# Patient Record
Sex: MALE | Race: WHITE | NOT HISPANIC OR LATINO | URBAN - METROPOLITAN AREA
[De-identification: names, ages, dates, MRNs, and addresses within clinical notes are randomized per-mention and may not be internally consistent; named-entity substitution may affect disease eponyms.]

---

## 2017-03-11 ENCOUNTER — EMERGENCY (EMERGENCY)
Facility: HOSPITAL | Age: 52
LOS: 1 days | Discharge: HOME | End: 2017-03-11
Attending: EMERGENCY MEDICINE | Admitting: EMERGENCY MEDICINE
Payer: COMMERCIAL

## 2017-03-11 VITALS
RESPIRATION RATE: 18 BRPM | TEMPERATURE: 98 F | WEIGHT: 214.95 LBS | HEIGHT: 72 IN | HEART RATE: 76 BPM | SYSTOLIC BLOOD PRESSURE: 161 MMHG | OXYGEN SATURATION: 100 % | DIASTOLIC BLOOD PRESSURE: 97 MMHG

## 2017-03-11 DIAGNOSIS — M79.641 PAIN IN RIGHT HAND: ICD-10-CM

## 2017-03-11 DIAGNOSIS — I10 ESSENTIAL (PRIMARY) HYPERTENSION: ICD-10-CM

## 2017-03-11 DIAGNOSIS — G56.01 CARPAL TUNNEL SYNDROME, RIGHT UPPER LIMB: ICD-10-CM

## 2017-03-11 PROCEDURE — 99283 EMERGENCY DEPT VISIT LOW MDM: CPT | Mod: 25

## 2017-03-11 PROCEDURE — 99283 EMERGENCY DEPT VISIT LOW MDM: CPT

## 2017-03-11 RX ORDER — OXYCODONE HYDROCHLORIDE 5 MG/1
1 TABLET ORAL
Qty: 8 | Refills: 0 | OUTPATIENT
Start: 2017-03-11 | End: 2017-03-13

## 2017-03-11 RX ORDER — OXYCODONE HYDROCHLORIDE 5 MG/1
5 TABLET ORAL ONCE
Qty: 0 | Refills: 0 | Status: DISCONTINUED | OUTPATIENT
Start: 2017-03-11 | End: 2017-03-11

## 2017-03-11 RX ORDER — IBUPROFEN 200 MG
600 TABLET ORAL ONCE
Qty: 0 | Refills: 0 | Status: COMPLETED | OUTPATIENT
Start: 2017-03-11 | End: 2017-03-11

## 2017-03-11 RX ADMIN — OXYCODONE HYDROCHLORIDE 5 MILLIGRAM(S): 5 TABLET ORAL at 04:28

## 2017-03-11 RX ADMIN — Medication 600 MILLIGRAM(S): at 03:48

## 2017-03-11 NOTE — ED PROVIDER NOTE - MEDICAL DECISION MAKING DETAILS
50 yo with hx of carpel tunnel coming in with right hand pain s.p pulling injury, no numbness no tingling, severe pain over the carpel tunnel region. PLAN: pain control and have follow up with hand.

## 2017-03-11 NOTE — ED PROVIDER NOTE - ATTENDING CONTRIBUTION TO CARE
52 yo with hx of carpel tunnel coming in with right hand pain s.p pulling injury, no numbness no tingling, severe pain over the carpel tunnel region. PLAN: pain control and have follow up with hand.

## 2017-03-11 NOTE — ED PROVIDER NOTE - PLAN OF CARE
You may take 600mg of ibuprofen (example: motrin or advil) every 4-6 hours for baseline pain control as indicated with respect to the warnings on the label. This is an over the counter medication.   Take oxycodone as needed, as directed, for breakthrough pain. DO NOT DRINK OR OPERATE HEAVY MACHINERY UNDER THE INFLUENCE OF OXYCODONE. Additionally, take a stool softener while taking this medication.  follow up your Hand doctor tomorrow for definitive care

## 2017-03-11 NOTE — ED PROVIDER NOTE - OBJECTIVE STATEMENT
51 year old with severe hand pain in the right palm and 2, 3, 4th fingers. paresthesia sensation. history severe carpal tunnel with plan for surgery. pain began tonight. cant fall asleep. works construction. incident occurred when a heavy object slid out of his hand.     hand surgeon: Maureen

## 2017-03-11 NOTE — ED ADULT NURSE NOTE - OBJECTIVE STATEMENT
51 y.o male pmh HTN and carpal tunnel c/o R. hand pain and swelling. pt states he has a hx of carpal tunnel and tonight while trying to sleep he had sudden onset of R. hand pain, describes the pain as aching and throbbing, pain rated 9/10. did not take anything at home for pain prior to ED arrival. states he feels numbness in his fingers, no tingling. pulses present with equal warmth bilaterally. pt states that he usually gets cortisone injections in his hand which helps to relieve the pain. no decrease ROM present.

## 2017-03-11 NOTE — ED PROVIDER NOTE - NS ED ROS FT
ROS: No fever/chills, no eye pain, no throat pain, no chest pain, no shortness of breath, no abdominal pain,  no dysuria, right hand pain, no rashes, no focal neurologic complaints, no known mental health issues

## 2017-03-11 NOTE — ED PROVIDER NOTE - CARE PLAN
Principal Discharge DX:	Carpal tunnel syndrome  Instructions for follow-up, activity and diet:	You may take 600mg of ibuprofen (example: motrin or advil) every 4-6 hours for baseline pain control as indicated with respect to the warnings on the label. This is an over the counter medication.   Take oxycodone as needed, as directed, for breakthrough pain. DO NOT DRINK OR OPERATE HEAVY MACHINERY UNDER THE INFLUENCE OF OXYCODONE. Additionally, take a stool softener while taking this medication.  follow up your Hand doctor tomorrow for definitive care

## 2017-03-11 NOTE — ED PROVIDER NOTE - PROGRESS NOTE DETAILS
Earnestine PGY2: consulted Orthopedics, they do not do hand injections for carpal tunnel. patient offered to call hand specialist but patient declined and requested discharge with plan to follow up his doctor today

## 2017-03-11 NOTE — ED PROVIDER NOTE - PHYSICAL EXAMINATION
Earnestine: A & O x 3, NAD, HEENT WNL and no facial asymmetry; lungs CTAB, heart with reg rhythm without murmur; abdomen soft NTND; extremities with no edema; right hand with paresthesia in medial 2nd, 3rd, 4th digit and tender to palpation, no erythema and rash, diminished ability to flex fingers in right hand, neuro exam non focal with no motor or sensory deficits

## 2017-05-30 ENCOUNTER — OUTPATIENT (OUTPATIENT)
Dept: OUTPATIENT SERVICES | Facility: HOSPITAL | Age: 52
LOS: 1 days | End: 2017-05-30
Payer: COMMERCIAL

## 2017-05-30 VITALS
TEMPERATURE: 98 F | WEIGHT: 207.9 LBS | RESPIRATION RATE: 16 BRPM | SYSTOLIC BLOOD PRESSURE: 130 MMHG | DIASTOLIC BLOOD PRESSURE: 86 MMHG | HEART RATE: 69 BPM | HEIGHT: 70.5 IN

## 2017-05-30 DIAGNOSIS — I10 ESSENTIAL (PRIMARY) HYPERTENSION: ICD-10-CM

## 2017-05-30 DIAGNOSIS — G56.01 CARPAL TUNNEL SYNDROME, RIGHT UPPER LIMB: ICD-10-CM

## 2017-05-30 DIAGNOSIS — Z90.49 ACQUIRED ABSENCE OF OTHER SPECIFIED PARTS OF DIGESTIVE TRACT: Chronic | ICD-10-CM

## 2017-05-30 DIAGNOSIS — G47.33 OBSTRUCTIVE SLEEP APNEA (ADULT) (PEDIATRIC): ICD-10-CM

## 2017-05-30 DIAGNOSIS — G56.00 CARPAL TUNNEL SYNDROME, UNSPECIFIED UPPER LIMB: ICD-10-CM

## 2017-05-30 LAB
BUN SERPL-MCNC: 21 MG/DL — SIGNIFICANT CHANGE UP (ref 7–23)
CALCIUM SERPL-MCNC: 9.3 MG/DL — SIGNIFICANT CHANGE UP (ref 8.4–10.5)
CHLORIDE SERPL-SCNC: 98 MMOL/L — SIGNIFICANT CHANGE UP (ref 98–107)
CO2 SERPL-SCNC: 25 MMOL/L — SIGNIFICANT CHANGE UP (ref 22–31)
CREAT SERPL-MCNC: 1.23 MG/DL — SIGNIFICANT CHANGE UP (ref 0.5–1.3)
GLUCOSE SERPL-MCNC: 94 MG/DL — SIGNIFICANT CHANGE UP (ref 70–99)
HCT VFR BLD CALC: 48 % — SIGNIFICANT CHANGE UP (ref 39–50)
HGB BLD-MCNC: 16.6 G/DL — SIGNIFICANT CHANGE UP (ref 13–17)
MCHC RBC-ENTMCNC: 31.9 PG — SIGNIFICANT CHANGE UP (ref 27–34)
MCHC RBC-ENTMCNC: 34.6 % — SIGNIFICANT CHANGE UP (ref 32–36)
MCV RBC AUTO: 92.1 FL — SIGNIFICANT CHANGE UP (ref 80–100)
PLATELET # BLD AUTO: 238 K/UL — SIGNIFICANT CHANGE UP (ref 150–400)
PMV BLD: 11.2 FL — SIGNIFICANT CHANGE UP (ref 7–13)
POTASSIUM SERPL-MCNC: 3.8 MMOL/L — SIGNIFICANT CHANGE UP (ref 3.5–5.3)
POTASSIUM SERPL-SCNC: 3.8 MMOL/L — SIGNIFICANT CHANGE UP (ref 3.5–5.3)
RBC # BLD: 5.21 M/UL — SIGNIFICANT CHANGE UP (ref 4.2–5.8)
RBC # FLD: 14.2 % — SIGNIFICANT CHANGE UP (ref 10.3–14.5)
SODIUM SERPL-SCNC: 139 MMOL/L — SIGNIFICANT CHANGE UP (ref 135–145)
WBC # BLD: 9.34 K/UL — SIGNIFICANT CHANGE UP (ref 3.8–10.5)
WBC # FLD AUTO: 9.34 K/UL — SIGNIFICANT CHANGE UP (ref 3.8–10.5)

## 2017-05-30 PROCEDURE — 93010 ELECTROCARDIOGRAM REPORT: CPT

## 2017-05-30 NOTE — H&P PST ADULT - NEGATIVE MUSCULOSKELETAL SYMPTOMS
no neck pain/no back pain no arthritis/no muscle weakness/no back pain/no muscle cramps/no stiffness/no neck pain

## 2017-05-30 NOTE — H&P PST ADULT - LAST STRESS TEST
2016 "was normal", cardiologist unable to recall name 2016 "was normal", cardiologist, states, unable to recall name of the cardiologist

## 2017-05-30 NOTE — H&P PST ADULT - NEGATIVE ENMT SYMPTOMS
no nasal congestion/no nasal discharge/no post-nasal discharge/no vertigo/no sinus symptoms/no ear pain/no nasal obstruction/no tinnitus/no hearing difficulty

## 2017-05-30 NOTE — H&P PST ADULT - PROBLEM SELECTOR PLAN 1
Scheduled for right carpal tunnel release on 6/2/2017. labs done and results pending. Preop instruction given and explained. Famotidine provided with instruction. Verbalized understanding.

## 2017-05-30 NOTE — H&P PST ADULT - HISTORY OF PRESENT ILLNESS
52 yo male with hx of HTN presents c/o right hand numbn 52 yo male with hx of HTN presents to have PST evaluation with preop dx of carpal tunnel syndrome, right upper limb. Patient c/o right hand & wrist numbness and tingling pain x 1 year, went for an evaluation, had multiple steroid injections with relief, now scheduled for right carpal tunnel release on 6/2/2017.

## 2017-05-30 NOTE — H&P PST ADULT - PROBLEM SELECTOR PLAN 2
EKG done in PST. Comparison EKG in chart. Instructed to take amlodipine AM of surgery with a sip of water.

## 2017-05-30 NOTE — H&P PST ADULT - NEGATIVE ALLERGY TYPES
no indoor environmental allergies/no reactions to food/no outdoor environmental allergies/no reactions to animals

## 2017-05-30 NOTE — H&P PST ADULT - NEGATIVE OPHTHALMOLOGIC SYMPTOMS
no diplopia/no blurred vision R/no blurred vision L/no lacrimation L/no photophobia/no lacrimation R

## 2017-05-30 NOTE — H&P PST ADULT - LAST ECHOCARDIOGRAM
2016 "was normal", cardiologist, unable to recall name 2016 "was normal", cardiologist, states, unable to recall name of the cardiologist

## 2017-05-30 NOTE — H&P PST ADULT - NSANTHOSAYNRD_GEN_A_CORE
No. TONI screening performed.  STOP BANG Legend: 0-2 = LOW Risk; 3-4 = INTERMEDIATE Risk; 5-8 = HIGH Risk

## 2017-05-30 NOTE — H&P PST ADULT - RS GEN PE MLT RESP DETAILS PC
no wheezes/respirations non-labored/good air movement/breath sounds equal/airway patent/no rales/no rhonchi/clear to auscultation bilaterally

## 2017-06-02 ENCOUNTER — OUTPATIENT (OUTPATIENT)
Dept: OUTPATIENT SERVICES | Facility: HOSPITAL | Age: 52
LOS: 1 days | Discharge: HOME | End: 2017-06-02

## 2017-06-02 VITALS
RESPIRATION RATE: 20 BRPM | HEART RATE: 72 BPM | DIASTOLIC BLOOD PRESSURE: 90 MMHG | TEMPERATURE: 98 F | OXYGEN SATURATION: 20 % | SYSTOLIC BLOOD PRESSURE: 150 MMHG | HEIGHT: 70.5 IN | WEIGHT: 207.9 LBS

## 2017-06-02 VITALS — HEART RATE: 65 BPM | OXYGEN SATURATION: 100 % | RESPIRATION RATE: 16 BRPM

## 2017-06-02 DIAGNOSIS — Z90.49 ACQUIRED ABSENCE OF OTHER SPECIFIED PARTS OF DIGESTIVE TRACT: Chronic | ICD-10-CM

## 2017-06-02 DIAGNOSIS — G56.01 CARPAL TUNNEL SYNDROME, RIGHT UPPER LIMB: ICD-10-CM

## 2017-06-02 NOTE — ASU DISCHARGE PLAN (ADULT/PEDIATRIC). - MEDICATION SUMMARY - MEDICATIONS TO TAKE
I will START or STAY ON the medications listed below when I get home from the hospital:    see medication reconciliation  --     -- Indication: For Carpal tunnel syndrome of right wrist

## 2017-06-02 NOTE — ASU DISCHARGE PLAN (ADULT/PEDIATRIC). - NOTIFY
Persistent Nausea and Vomiting/Unable to Urinate/Inability to Tolerate Liquids or Foods/Bleeding that does not stop/Numbness, color, or temperature change to extremity/Swelling that continues/Pain not relieved by Medications/Excessive Diarrhea/GYN Fever>100.4/Fever greater than 101/Numbness, tingling/Increased Irritability or Sluggishness

## 2017-06-02 NOTE — ASU DISCHARGE PLAN (ADULT/PEDIATRIC). - ACTIVITY LEVEL
no exercise/elevate extremity/no heavy lifting no weight bearing/elevate extremity/no exercise/no heavy lifting

## 2017-06-02 NOTE — ASU DISCHARGE PLAN (ADULT/PEDIATRIC). - SPECIAL INSTRUCTIONS
Please read enclosed teaching sheet.   Please elevate right hand.. keep dressing clean, dry and intact

## 2019-01-16 PROBLEM — G56.00 CARPAL TUNNEL SYNDROME, UNSPECIFIED UPPER LIMB: Chronic | Status: ACTIVE | Noted: 2017-03-11

## 2019-01-16 PROBLEM — K57.92 DIVERTICULITIS OF INTESTINE, PART UNSPECIFIED, WITHOUT PERFORATION OR ABSCESS WITHOUT BLEEDING: Chronic | Status: ACTIVE | Noted: 2017-05-30

## 2019-01-16 PROBLEM — I10 ESSENTIAL (PRIMARY) HYPERTENSION: Chronic | Status: ACTIVE | Noted: 2017-05-30

## 2019-01-16 PROBLEM — K22.70 BARRETT'S ESOPHAGUS WITHOUT DYSPLASIA: Chronic | Status: ACTIVE | Noted: 2017-03-11

## 2019-01-23 ENCOUNTER — OUTPATIENT (OUTPATIENT)
Dept: OUTPATIENT SERVICES | Facility: HOSPITAL | Age: 54
LOS: 1 days | End: 2019-01-23
Payer: COMMERCIAL

## 2019-01-23 VITALS
HEART RATE: 86 BPM | DIASTOLIC BLOOD PRESSURE: 84 MMHG | RESPIRATION RATE: 16 BRPM | HEIGHT: 72 IN | WEIGHT: 225.09 LBS | OXYGEN SATURATION: 97 % | SYSTOLIC BLOOD PRESSURE: 134 MMHG | TEMPERATURE: 98 F

## 2019-01-23 DIAGNOSIS — Z90.49 ACQUIRED ABSENCE OF OTHER SPECIFIED PARTS OF DIGESTIVE TRACT: Chronic | ICD-10-CM

## 2019-01-23 DIAGNOSIS — G56.02 CARPAL TUNNEL SYNDROME, LEFT UPPER LIMB: ICD-10-CM

## 2019-01-23 DIAGNOSIS — Z98.890 OTHER SPECIFIED POSTPROCEDURAL STATES: Chronic | ICD-10-CM

## 2019-01-23 LAB
ANION GAP SERPL CALC-SCNC: 11 MMO/L — SIGNIFICANT CHANGE UP (ref 7–14)
BUN SERPL-MCNC: 12 MG/DL — SIGNIFICANT CHANGE UP (ref 7–23)
CALCIUM SERPL-MCNC: 9.6 MG/DL — SIGNIFICANT CHANGE UP (ref 8.4–10.5)
CHLORIDE SERPL-SCNC: 100 MMOL/L — SIGNIFICANT CHANGE UP (ref 98–107)
CO2 SERPL-SCNC: 29 MMOL/L — SIGNIFICANT CHANGE UP (ref 22–31)
CREAT SERPL-MCNC: 1.11 MG/DL — SIGNIFICANT CHANGE UP (ref 0.5–1.3)
GLUCOSE SERPL-MCNC: 136 MG/DL — HIGH (ref 70–99)
HCT VFR BLD CALC: 44.1 % — SIGNIFICANT CHANGE UP (ref 39–50)
HGB BLD-MCNC: 14.9 G/DL — SIGNIFICANT CHANGE UP (ref 13–17)
MCHC RBC-ENTMCNC: 31.6 PG — SIGNIFICANT CHANGE UP (ref 27–34)
MCHC RBC-ENTMCNC: 33.8 % — SIGNIFICANT CHANGE UP (ref 32–36)
MCV RBC AUTO: 93.6 FL — SIGNIFICANT CHANGE UP (ref 80–100)
NRBC # FLD: 0 K/UL — LOW (ref 25–125)
PLATELET # BLD AUTO: 216 K/UL — SIGNIFICANT CHANGE UP (ref 150–400)
PMV BLD: 10 FL — SIGNIFICANT CHANGE UP (ref 7–13)
POTASSIUM SERPL-MCNC: 4.1 MMOL/L — SIGNIFICANT CHANGE UP (ref 3.5–5.3)
POTASSIUM SERPL-SCNC: 4.1 MMOL/L — SIGNIFICANT CHANGE UP (ref 3.5–5.3)
RBC # BLD: 4.71 M/UL — SIGNIFICANT CHANGE UP (ref 4.2–5.8)
RBC # FLD: 13.4 % — SIGNIFICANT CHANGE UP (ref 10.3–14.5)
SODIUM SERPL-SCNC: 140 MMOL/L — SIGNIFICANT CHANGE UP (ref 135–145)
WBC # BLD: 6.94 K/UL — SIGNIFICANT CHANGE UP (ref 3.8–10.5)
WBC # FLD AUTO: 6.94 K/UL — SIGNIFICANT CHANGE UP (ref 3.8–10.5)

## 2019-01-23 PROCEDURE — 93010 ELECTROCARDIOGRAM REPORT: CPT

## 2019-01-23 NOTE — H&P PST ADULT - LYMPHATIC
supraclavicular R/posterior cervical L/posterior cervical R/anterior cervical L/anterior cervical R/supraclavicular L

## 2019-01-23 NOTE — H&P PST ADULT - HISTORY OF PRESENT ILLNESS
This is a 53 y.o male with left hand pain associated with intermittent numbness . Pt was evaluated . Pt has carpal tunnel syndrome ; left upper limb . Pt now for surgery .

## 2019-01-23 NOTE — H&P PST ADULT - ENMT COMMENTS
had sore throat 1/20/19 strep negative at [facility] , pt placed on augmentin 1/20/19

## 2019-01-23 NOTE — H&P PST ADULT - PSYCHIATRIC DETAILS
Bedside shift change report given to Antonia Rios (oncoming nurse) by Lay Shanks   (offgoing nurse).  Report included the following information SBAR, Kardex, ED Summary, Procedure Summary, Intake/Output, MAR, Recent Results and Cardiac Rhythm SR. normal affect/normal behavior

## 2019-01-23 NOTE — H&P PST ADULT - RS GEN PE MLT RESP DETAILS PC
good air movement/clear to auscultation bilaterally/breath sounds equal/respirations non-labored/no rales/no rhonchi/no wheezes

## 2019-01-23 NOTE — H&P PST ADULT - PMH
Renteria esophagus    Carpal tunnel syndrome    Diverticulitis    Hypertension    Hypertension Renteria esophagus    Carpal tunnel syndrome    Diverticulitis    Hypertension    Sore throat  evaluated 1/20/19 [facility] , strep negative , pt treated with augmentin start 1/20/19 for 7 day treatment

## 2019-01-23 NOTE — H&P PST ADULT - TEACHING/LEARNING LEARNING PREFERENCES
written material/pictorial/audio/group instruction/verbal instruction/skill demonstration/computer/internet/video/individual instruction

## 2019-01-23 NOTE — H&P PST ADULT - MUSCULOSKELETAL
details… detailed exam ROM intact/no joint erythema/normal strength/no joint swelling/no joint warmth/no calf tenderness

## 2019-01-24 PROBLEM — I10 ESSENTIAL (PRIMARY) HYPERTENSION: Chronic | Status: INACTIVE | Noted: 2017-03-11 | Resolved: 2019-01-23

## 2019-02-01 ENCOUNTER — OUTPATIENT (OUTPATIENT)
Dept: OUTPATIENT SERVICES | Facility: HOSPITAL | Age: 54
LOS: 1 days | Discharge: HOME | End: 2019-02-01

## 2019-02-01 VITALS
HEART RATE: 74 BPM | OXYGEN SATURATION: 97 % | WEIGHT: 225.09 LBS | RESPIRATION RATE: 18 BRPM | TEMPERATURE: 98 F | DIASTOLIC BLOOD PRESSURE: 92 MMHG | SYSTOLIC BLOOD PRESSURE: 140 MMHG | HEIGHT: 72 IN

## 2019-02-01 VITALS
HEART RATE: 72 BPM | DIASTOLIC BLOOD PRESSURE: 58 MMHG | OXYGEN SATURATION: 97 % | SYSTOLIC BLOOD PRESSURE: 102 MMHG | RESPIRATION RATE: 18 BRPM

## 2019-02-01 DIAGNOSIS — Z98.890 OTHER SPECIFIED POSTPROCEDURAL STATES: Chronic | ICD-10-CM

## 2019-02-01 DIAGNOSIS — Z90.49 ACQUIRED ABSENCE OF OTHER SPECIFIED PARTS OF DIGESTIVE TRACT: Chronic | ICD-10-CM

## 2019-02-01 DIAGNOSIS — G56.02 CARPAL TUNNEL SYNDROME, LEFT UPPER LIMB: ICD-10-CM

## 2019-02-01 NOTE — ASU DISCHARGE PLAN (ADULT/PEDIATRIC). - NOTIFY
see instruction sheet Bleeding that does not stop/Numbness, color, or temperature change to extremity/Pain not relieved by Medications/Persistent Nausea and Vomiting/Swelling that continues/see instruction sheet

## 2019-02-01 NOTE — ASU DISCHARGE PLAN (ADULT/PEDIATRIC). - MEDICATION SUMMARY - MEDICATIONS TO TAKE
I will START or STAY ON the medications listed below when I get home from the hospital:    see medication reconciliation  --     -- Indication: For home med    oxyCODONE 5 mg oral tablet  -- 1 tab(s) by mouth every 6 hours MDD:4 pills per day  -- Caution federal law prohibits the transfer of this drug to any person other  than the person for whom it was prescribed.  It is very important that you take or use this exactly as directed.  Do not skip doses or discontinue unless directed by your doctor.  May cause drowsiness.  Alcohol may intensify this effect.  Use care when operating dangerous machinery.  This prescription cannot be refilled.  Using more of this medication than prescribed may cause serious breathing problems.    -- Indication: For home emd    Augmentin 875 mg-125 mg oral tablet  -- 1 tab(s) by mouth every 12 hours  -- Indication: For home med

## 2019-02-01 NOTE — ASU DISCHARGE PLAN (ADULT/PEDIATRIC). - NURSING INSTRUCTIONS
Follow doctor's post op instructions. Call office with any questions or problems, and to make follow  up appointment. Keep left arm elevated as much as possible. Take pain medication if needed for pain.

## 2019-02-01 NOTE — BRIEF OPERATIVE NOTE - PROCEDURE
<<-----Click on this checkbox to enter Procedure Carpal tunnel release  02/01/2019    Active  [id number]

## 2022-01-24 NOTE — ASU PREOP CHECKLIST - BMI (KG/M2)
Requested Prescriptions     Pending Prescriptions Disp Refills    traZODone (DESYREL) 50 mg tablet 45 Tablet 5     Sig: TAKE 1 AND 1/2 TABLET BY MOUTH DAILY.         Last Visit 11/03/21  Last Refill 11/03/21
29.4

## 2022-03-17 NOTE — H&P PST ADULT - NEGATIVE GASTROINTESTINAL SYMPTOMS
SLEEP APNEA FOLLOW UP NOTE     Wali Pickard is a 62 year old male who presents for follow up management of sleep apnea.       HPI     Pt. Concerns: pt had taken a break from cpap due to worrying about covid germs and getting sick.  He has since gone back to using it due to blood pressure increasing and he is sleeping so much better with it.    Type of Therapy: AutoPAP   Pressure Settin-12  cmH20  Mask Type: Full Face    DME: cp - remote access     Compliance Download:     DATES: 2/15/22 - 3/16/22   % DAYS > 4 HOURS 73 %   Ave Usage (days used) 7+ HOURS   95th% PRESSURE 11.0     95th% MASK LEAK 20.9 L/MIN   AHI 0.5 /HR     Mask comfort and fit appropriate: Yes   Patient denies insufficient or excessive airflow: Yes      [place] Sleepiness Scale:     0 = would never doze  1 = slight chance of dozing  2 = moderate chance of dozing  3 = high chance of dozing    Situation     Chance of Dozing       1. Sitting and reading   0    2. Watching TV    0    3. Sitting, inactive in a public place       (e.g. a theatre or a meeting)  0    4. As a passenger in a car for an hour       without a break    1  5. Lying down to rest in the afternoon      when circumstances permit  1  6. Sitting and talking to someone  0    7. Sitting quietly after lunch without      Alcohol     0    8. In a car, while stopped for a few      Minutes in traffic    0      TOTAL 2       Past Medical History      Past Medical History:   Diagnosis Date   • Adenomatous polyps     x 1-Recheck 2018-[facility]   • Asthma    • DVT, lower extremity (CMS/HCC)     LLE   • Obesity         ALLERGIES:   Allergen Reactions   • Gabapentin DEPRESSION     Nightmares     Current Outpatient Medications   Medication Sig   • fluticasone (FLONASE) 50 MCG/ACT nasal spray SHAKE LIQUID AND USE 2 SPRAYS IN EACH NOSTRIL DAILY   • allopurinol (ZYLOPRIM) 300 MG tablet TAKE 1 TABLET BY MOUTH DAILY   • montelukast (SINGULAIR) 10 MG tablet TAKE 1 TABLET BY MOUTH EVERY DAY   •  lisinopril-hydroCHLOROthiazide (ZESTORETIC) 10-12.5 MG per tablet TAKE 1 TABLET BY MOUTH DAILY   • fluticasone-salmeterol (Advair HFA) 115-21 MCG/ACT inhaler Inhale 2 puffs into the lungs 2 times daily.   • atorvastatin (LIPITOR) 20 MG tablet Take 1 tablet by mouth daily.   • albuterol 108 (90 Base) MCG/ACT inhaler Inhale 2 puffs into the lungs every 4 hours as needed (prn).   • aspirin 81 MG tablet Take 81 mg by mouth. Patient taking 1 a week   • HYDROcodone-acetaminophen (NORCO)  MG per tablet Take 1 tablet by mouth 2 times daily as needed for Pain.   • methylPREDNISolone (MEDROL DOSEPAK) 4 MG tablet Take 1 tablet by mouth as directed. follow package directions   • colchicine (COLCRYS) 0.6 MG tablet TAKE 1 TABLET BY MOUTH DAILY AS NEEDED FOR GOUT   • cholecalciferol (VITAMIN D3) 1000 UNITS tablet Take 2,000 Units by mouth daily.     No current facility-administered medications for this visit.           Review Of Systems     Review of Systems   Constitutional: Negative.   HENT: Negative.    Eyes: Negative.    Cardiovascular: Negative.    Respiratory: Negative.    Endocrine: Negative.    Hematologic/Lymphatic: Negative.    Skin: Negative.    Musculoskeletal: Negative.    Gastrointestinal: Negative.    Genitourinary: Negative.    Neurological: Negative.    Psychiatric/Behavioral: Negative.    Allergic/Immunologic: Negative.          Physical Exam     Vitals:    03/17/22 1505   BP: 123/75   Pulse: 88   Resp: 16   Temp: 96.9 °F (36.1 °C)   SpO2: 95%   Weight: 131.5 kg (290 lb)   Height: 5' 10\" (1.778 m)      Body mass index is 41.61 kg/m².       Nasal Passages: [x]Clear   [] Congested    Gen: No acute distress. Pleasant and interactive.  Head:  Normocephalic and atraumatic.  Eyes: Conjunctiva and sclera normal.   Lungs:  Normal respiratory rate and effort.  Extremities:  No edema in lower extremities.   Skin: Warm and dry, no rash.  Musculoskeletal:  No joint swelling or tenderness  Psych:  Affect was appropriate  to situation and mood was normal.  Neuro:  Alert and oriented, attention and recall preserved, cranial nerves intact, motor strength preserved, coordination intact, sensation preserved, reflexes symmetric, gait normal.    Assessment/Plan:     Problem List Items Addressed This Visit        Sleep    Obstructive sleep apnea      Other Visit Diagnoses     Obstructive sleep apnea (adult) (pediatric)    -  Primary    Relevant Orders    SERVICE TO HOME CARE RESPIRATORY THERAPY        Patient is benefiting from CPAP/BiPAP therapy: Yes   Patient adherent to therapy: Yes   Daytime sleepiness compensated: Yes   Blood pressure controlled: Yes       RECOMMENDATIONS:  Continue current CPAP/BiPAP: Yes   Replace current mask and supplies.   Mask change or new Rx: none.  Weight Reduction Plan: reviewed with patient    Script sent to [facility] in regards to supplies.   Patient Counseling:     We reviewed the importance of continued treatment of sleep apnea to reduce cardiovascular risk.  The patient was encouraged to wear CPAP/BiPAP Therapy every night for at least 4 hours, to bring their machine to any procedure that requires sedation, and was advised not to drive while sleepy.   We reviewed tips for traveling with CPAP, air leak, nasal congestion, skin irritation, and gas/bloating.  We reviewed cleaning and replacement of supplies in detail.   The correct phone number for DME was given to patient.    Reviewed compliance report with pt in detail. Pt denies any concerns with his APAP at this time.   Follow Up:     Return in about 1 year (around 3/17/2023).     Salma Swan, IRMA     no nausea/no constipation/no vomiting/no melena/no diarrhea

## 2023-01-29 NOTE — H&P PST ADULT - COMFORT LEVEL, ACCEPTABLE
Patient with episode of SVT to 140s, though remained hemodynamically stable 1/26 PM  - vagal manuevers did not break arrhythmia  - appears to be atrial tachycardia; tele strips reviewed, arrhythmia breaks with p wave  - required RRT --> adenosine 6 1/27, 1/26  - possibly reactive iso infection, also missed AM toprol dose  - may have been component of volume overload given pt was on gentle hydration    Plan:  > toprol 150 qd switched to lopressor 50 q6h with liberalized hold parameters to ensure adequate AV diana blockade  > if pt has return of sustained atach can try AV diana agents (lopressor or dilt) if HD stable. Will likely need amio if that fails  > continue to monitor on telemetry 5

## 2023-02-06 NOTE — ASU DISCHARGE PLAN (ADULT/PEDIATRIC). - ASU FOLLOWUP
Prescription approved per [facility] Refill Protocol.  Eliana Concepcion RN      [facility] ([facility]):

## 2024-07-13 ENCOUNTER — INPATIENT (INPATIENT)
Facility: HOSPITAL | Age: 59
LOS: 3 days | Discharge: HOME | DRG: 440 | End: 2024-07-17
Attending: STUDENT IN AN ORGANIZED HEALTH CARE EDUCATION/TRAINING PROGRAM | Admitting: STUDENT IN AN ORGANIZED HEALTH CARE EDUCATION/TRAINING PROGRAM
Payer: COMMERCIAL

## 2024-07-13 VITALS
HEART RATE: 100 BPM | OXYGEN SATURATION: 97 % | TEMPERATURE: 98 F | DIASTOLIC BLOOD PRESSURE: 93 MMHG | WEIGHT: 199.96 LBS | RESPIRATION RATE: 18 BRPM | SYSTOLIC BLOOD PRESSURE: 146 MMHG

## 2024-07-13 DIAGNOSIS — Z98.890 OTHER SPECIFIED POSTPROCEDURAL STATES: Chronic | ICD-10-CM

## 2024-07-13 DIAGNOSIS — Z90.49 ACQUIRED ABSENCE OF OTHER SPECIFIED PARTS OF DIGESTIVE TRACT: Chronic | ICD-10-CM

## 2024-07-13 LAB
ALBUMIN SERPL ELPH-MCNC: 4.1 G/DL — SIGNIFICANT CHANGE UP (ref 3.3–5)
ALP SERPL-CCNC: 70 U/L — SIGNIFICANT CHANGE UP (ref 40–120)
ALT FLD-CCNC: 35 U/L — SIGNIFICANT CHANGE UP (ref 10–45)
ANION GAP SERPL CALC-SCNC: 12 MMOL/L — SIGNIFICANT CHANGE UP (ref 5–17)
AST SERPL-CCNC: 54 U/L — HIGH (ref 10–40)
BILIRUB SERPL-MCNC: 0.4 MG/DL — SIGNIFICANT CHANGE UP (ref 0.2–1.2)
BUN SERPL-MCNC: 22 MG/DL — SIGNIFICANT CHANGE UP (ref 7–23)
CALCIUM SERPL-MCNC: 10.4 MG/DL — SIGNIFICANT CHANGE UP (ref 8.4–10.5)
CHLORIDE SERPL-SCNC: 99 MMOL/L — SIGNIFICANT CHANGE UP (ref 96–108)
CO2 SERPL-SCNC: 25 MMOL/L — SIGNIFICANT CHANGE UP (ref 22–31)
CREAT SERPL-MCNC: 0.86 MG/DL — SIGNIFICANT CHANGE UP (ref 0.5–1.3)
EGFR: 100 ML/MIN/1.73M2 — SIGNIFICANT CHANGE UP
GLUCOSE SERPL-MCNC: 127 MG/DL — HIGH (ref 70–99)
HCT VFR BLD CALC: 45.2 % — SIGNIFICANT CHANGE UP (ref 39–50)
HGB BLD-MCNC: 15.7 G/DL — SIGNIFICANT CHANGE UP (ref 13–17)
HIV 1 & 2 AB SERPL IA.RAPID: SIGNIFICANT CHANGE UP
MCHC RBC-ENTMCNC: 32.7 PG — SIGNIFICANT CHANGE UP (ref 27–34)
MCHC RBC-ENTMCNC: 34.7 GM/DL — SIGNIFICANT CHANGE UP (ref 32–36)
MCV RBC AUTO: 94.2 FL — SIGNIFICANT CHANGE UP (ref 80–100)
NRBC # BLD: 0 /100 WBCS — SIGNIFICANT CHANGE UP (ref 0–0)
PLATELET # BLD AUTO: 185 K/UL — SIGNIFICANT CHANGE UP (ref 150–400)
POTASSIUM SERPL-MCNC: 4.4 MMOL/L — SIGNIFICANT CHANGE UP (ref 3.5–5.3)
POTASSIUM SERPL-SCNC: 4.4 MMOL/L — SIGNIFICANT CHANGE UP (ref 3.5–5.3)
PROT SERPL-MCNC: 7.2 G/DL — SIGNIFICANT CHANGE UP (ref 6–8.3)
RBC # BLD: 4.8 M/UL — SIGNIFICANT CHANGE UP (ref 4.2–5.8)
RBC # FLD: 12.3 % — SIGNIFICANT CHANGE UP (ref 10.3–14.5)
SODIUM SERPL-SCNC: 136 MMOL/L — SIGNIFICANT CHANGE UP (ref 135–145)
WBC # BLD: 8.3 K/UL — SIGNIFICANT CHANGE UP (ref 3.8–10.5)
WBC # FLD AUTO: 8.3 K/UL — SIGNIFICANT CHANGE UP (ref 3.8–10.5)

## 2024-07-13 PROCEDURE — 72132 CT LUMBAR SPINE W/DYE: CPT | Mod: 26,MC

## 2024-07-13 PROCEDURE — 99285 EMERGENCY DEPT VISIT HI MDM: CPT

## 2024-07-13 PROCEDURE — 74177 CT ABD & PELVIS W/CONTRAST: CPT | Mod: 26,MC

## 2024-07-13 PROCEDURE — 71260 CT THORAX DX C+: CPT | Mod: 26,MC

## 2024-07-13 PROCEDURE — 72129 CT CHEST SPINE W/DYE: CPT | Mod: 26,MC

## 2024-07-13 RX ORDER — DEXTROSE MONOHYDRATE AND SODIUM CHLORIDE 5; .3 G/100ML; G/100ML
1000 INJECTION, SOLUTION INTRAVENOUS ONCE
Refills: 0 | Status: COMPLETED | OUTPATIENT
Start: 2024-07-13 | End: 2024-07-13

## 2024-07-13 RX ORDER — KETOROLAC TROMETHAMINE 30 MG/ML
15 INJECTION, SOLUTION INTRAMUSCULAR ONCE
Refills: 0 | Status: DISCONTINUED | OUTPATIENT
Start: 2024-07-13 | End: 2024-07-13

## 2024-07-13 RX ORDER — LIDOCAINE HCL 28 MG/G
1 GEL TOPICAL ONCE
Refills: 0 | Status: COMPLETED | OUTPATIENT
Start: 2024-07-13 | End: 2024-07-13

## 2024-07-13 RX ORDER — ACETAMINOPHEN 325 MG
1000 TABLET ORAL ONCE
Refills: 0 | Status: COMPLETED | OUTPATIENT
Start: 2024-07-13 | End: 2024-07-13

## 2024-07-13 RX ORDER — MORPHINE SULFATE 100 MG/1
4 TABLET, EXTENDED RELEASE ORAL ONCE
Refills: 0 | Status: DISCONTINUED | OUTPATIENT
Start: 2024-07-13 | End: 2024-07-13

## 2024-07-13 RX ADMIN — LIDOCAINE HCL 1 PATCH: 28 GEL TOPICAL at 22:21

## 2024-07-13 RX ADMIN — KETOROLAC TROMETHAMINE 15 MILLIGRAM(S): 30 INJECTION, SOLUTION INTRAMUSCULAR at 21:01

## 2024-07-13 RX ADMIN — DEXTROSE MONOHYDRATE AND SODIUM CHLORIDE 1000 MILLILITER(S): 5; .3 INJECTION, SOLUTION INTRAVENOUS at 18:01

## 2024-07-13 RX ADMIN — MORPHINE SULFATE 4 MILLIGRAM(S): 100 TABLET, EXTENDED RELEASE ORAL at 22:22

## 2024-07-13 RX ADMIN — Medication 400 MILLIGRAM(S): at 18:23

## 2024-07-13 NOTE — ED PROVIDER NOTE - CLINICAL SUMMARY MEDICAL DECISION MAKING FREE TEXT BOX
Vital signs not actionable. Based on patient presentation and history differential includes but is not limited to cancerous vs. infectious etiology. Plan for CBC, CMP, magnesium, UA w/ urine culture, CT chest, abdomen, pelvis w/ IV contrast, back pain management, and IV hydration. The patient is a 58 y/o male with a PMHx of Renteria's esophagus (last EGD 2 years prior), diverticulitis with partial colectomy presenting w/ a 3 week hx of fatigue and diffuse myalgias. On initial exam VSS, physical exam w/ CV wnl, lungs CTAB, abd soft nontender nondistended, no edema, no conjunctival pallor. Based on patient presentation and history differential includes but is not limited to cancerous vs. infectious etiology. Plan for CBC, CMP, magnesium, UA w/ urine culture, CT chest, abdomen, pelvis w/ IV contrast, back pain management, IV hydration, re-assess. The patient is a 58 y/o male with a PMHx of Renteria's esophagus (last EGD 2 years prior), diverticulitis with partial colectomy presenting w/ a 3 week hx of fatigue and diffuse myalgias. On initial exam VSS, physical exam w/ CV wnl, lungs CTAB, abd soft nontender nondistended, no edema, no conjunctival pallor. Based on patient presentation and history differential includes but is not limited to cancerous vs. infectious etiology. Plan for CBC, CMP, magnesium, UA w/ urine culture, CT chest, abdomen, pelvis w/ IV contrast, back pain management, IV hydration, re-assess.    Attending Den: 58 y/o male w/ PMH of Renteria's esophagus (last EGD 2 years prior), diverticulitis with partial colectomy presenting w/ a 3 week hx of fatigue and diffuse myalgias. Seen w/ wife. The patient states that he is a  and used to go to the gym but no longer has energy to continue his daily routine. He also endorses a 15-20 lb weight loss in the past month. Denies early satiety but endorses general lack of appetite. He describes feeling "achy" and like his "bones hurt". He denies recent travel, recent URI sxs, N/V/D, dizziness, or trauma. His last endoscopy for his Renteria's esophagus was 2 years ago and was WNL. He has not had a physical examination in over 5 years and does not follow with a PCP. Denies fever/chills, dysuria, constipation, diarrhea. Pt overall no acute distress. Lungs clear. HR regular. Abd nondistended/soft/nontender. Non focal neuro exam. Calm and cooperative. Hx and symptoms concerning for B-symptoms w/ suspicion for malignancy. Will obtain CT chest/abd/pel to eval for malignancy. Eval for metabolic vs. infectious abnormalities. Plan for labs, imaging, EKG, meds. Will reassess the need for additional interventions as clinically warranted. Refer to any progress notes for updates on clinical course and as a continuation of this MDM.     I, Dr. Vik Crenshaw, independently interpreted the EKG which showed NSR at a rate of 93.

## 2024-07-13 NOTE — ED ADULT NURSE NOTE - NSICDXPASTMEDICALHX_GEN_ALL_CORE_FT
PAST MEDICAL HISTORY:  Renteria esophagus     Carpal tunnel syndrome     Diverticulitis     Hypertension     Sore throat evaluated 1/20/19 [facility] , strep negative , pt treated with augmentin start 1/20/19 for 7 day treatment

## 2024-07-13 NOTE — ED PROVIDER NOTE - ATTENDING CONTRIBUTION TO CARE
Attending Den: I performed a history and physical exam of the patient and discussed their management with the resident/fellow/student. I have reviewed the resident/fellow/student note and agree with the documented findings and plan of care, except as noted. I have personally performed a substantive portion of the visit including all aspects of the medical decision making. My medical decision making and observations are found above. Please refer to any progress notes for updates on clinical course. My notes supersedes the above resident/fellow/student note in case of discrepancy

## 2024-07-13 NOTE — ED PROVIDER NOTE - DATE/TIME 2
Called patient and went into much detail regarding the biopsy and D5/D6 expectations. Patient verbalized that I answered all her questions. I did provide the embryology number in case she has more questions.   14-Jul-2024 02:30

## 2024-07-13 NOTE — ED PROVIDER NOTE - CARE PLAN
Principal Discharge DX:	Dilated pancreatic duct  Secondary Diagnosis:	Dilated intrahepatic bile duct   1

## 2024-07-13 NOTE — ED ADULT NURSE REASSESSMENT NOTE - NS ED NURSE REASSESS COMMENT FT1
Report received from RN. Pt received A&Ox4, vitals retaken and documenter. Bed locked and in lowest position, side rails raised, call bell within reach. Currently pending CT and UA/UC

## 2024-07-13 NOTE — ED ADULT NURSE NOTE - OBJECTIVE STATEMENT
59 yr old male amb to ED with c/o bodyaches and loss of weight , 20lbs , over 2 weeks. Pt accomp by wife Pt alert and orientedx4 Resp even and nonlab Denies abd oain Denies N/V. has been doing a lot of OT at work in the sun and hot days. Pt has h/o Renteria's esophagus, diverticulitis with partial colectomy . C/O x3 week  fatigue .   He feels "achy" and  his "bones hurt". Denies ETOH, only socially, cigarette smoking.  Denies recent travel. Denies dizziness or lightheadedness. Denies rectal bleeding. Denies burn or diff urinating.

## 2024-07-13 NOTE — ED PROVIDER NOTE - OBJECTIVE STATEMENT
The patient is a 60 y/o male with a PMHx of Renteria's esophagus, diverticulitis with partial colectomy presenting w/ a 3 week hx of fatigue and diffuse myalgias. The patient states that he is a  and used to go to the gym but no longer has energy to continue his daily routine. He also endorses a 15-20 lb weight loss in the past month without significant loss of appetite. He describes feeling "achy" and like his "bones hurt". He denies recent travel, recent URI sxs, N/V/D, dizziness, or trauma. His last endoscopy for his Renteria's esophagus was 2 years ago and was WNL. He has not had a physical examination in over 5 years and does not follow with a PCP. The patient is a 58 y/o male with a PMHx of Renteria's esophagus, diverticulitis with partial colectomy presenting w/ a 3 week hx of fatigue and diffuse myalgias. The patient states that he is a  and used to go to the gym but no longer has energy to continue his daily routine. He also endorses a 15-20 lb weight loss in the past month with loss of appetite. He describes feeling "achy" and like his "bones hurt". He denies recent travel, recent URI sxs, N/V/D, dizziness, or trauma. His last endoscopy for his Renteria's esophagus was 2 years ago and was WNL. He has not had a physical examination in over 5 years and does not follow with a PCP. The patient is a 60 y/o male with a PMHx of Renteria's esophagus (last EGD 2 years prior), diverticulitis with partial colectomy presenting w/ a 3 week hx of fatigue and diffuse myalgias. The patient states that he is a  and used to go to the gym but no longer has energy to continue his daily routine. He also endorses a 15-20 lb weight loss in the past month. Denies early satiety but endorses general lack of appetite. He describes feeling "achy" and like his "bones hurt". He denies recent travel, recent URI sxs, N/V/D, dizziness, or trauma. His last endoscopy for his Renteria's esophagus was 2 years ago and was WNL. He has not had a physical examination in over 5 years and does not follow with a PCP. Denies fever/chills, dysuria, constipation, diarrhea.

## 2024-07-13 NOTE — ED PROVIDER NOTE - PROGRESS NOTE DETAILS
Unruly MARSH), PGY-2: received pt as a signout, pt reassessed at the bedside, reports minimal improvement in pain following tylenol toradol, will add morphine lidocaine patch, labs unremakrable, results discussed with pt, pending CT scans Unruly MARSH), PGY-2: received pt as a signout, pt reassessed at the bedside, reports minimal improvement in pain following tylenol toradol, continues to have lower sacral back pain, no midline spinal tenderness, will add morphine lidocaine patch, labs unremakrable, results discussed with pt, pending CT scans Unruly MARSH), PGY-2: emailed GI regarding consult. Unruly MARSH), PGY-2: CAT scan showing dilated pancreatic duct 8 mm with no identified obstructing lesion, extrahepatic biliary duct dilated to 12 mm, also mild intrahepatic ductal dilatation, all results discussed with patient, LFTs unremarkable on blood work, patient to be admitted to the hospital for GI consult, likely MRCP, will also add ultrasound right upper quadrant, patient in agreement with plan.

## 2024-07-13 NOTE — ED PROVIDER NOTE - NSICDXPASTSURGICALHX_GEN_ALL_CORE_FT
PAST SURGICAL HISTORY:  H/O carpal tunnel repair right -2018    S/P colon resection > 10 years ago

## 2024-07-13 NOTE — ED PROVIDER NOTE - NSICDXPASTMEDICALHX_GEN_ALL_CORE_FT
PAST MEDICAL HISTORY:  Renteria esophagus     Carpal tunnel syndrome     Diverticulitis     Hypertension     Sore throat evaluated 1/20/19 [facility] , strep negative , pt treated with augmentin start 1/20/19 for 7 day treatment

## 2024-07-14 DIAGNOSIS — R79.89 OTHER SPECIFIED ABNORMAL FINDINGS OF BLOOD CHEMISTRY: ICD-10-CM

## 2024-07-14 DIAGNOSIS — R53.81 OTHER MALAISE: ICD-10-CM

## 2024-07-14 DIAGNOSIS — Z87.19 PERSONAL HISTORY OF OTHER DISEASES OF THE DIGESTIVE SYSTEM: ICD-10-CM

## 2024-07-14 DIAGNOSIS — R93.5 ABNORMAL FINDINGS ON DIAGNOSTIC IMAGING OF OTHER ABDOMINAL REGIONS, INCLUDING RETROPERITONEUM: ICD-10-CM

## 2024-07-14 DIAGNOSIS — K86.89 OTHER SPECIFIED DISEASES OF PANCREAS: ICD-10-CM

## 2024-07-14 DIAGNOSIS — Z29.9 ENCOUNTER FOR PROPHYLACTIC MEASURES, UNSPECIFIED: ICD-10-CM

## 2024-07-14 PROBLEM — J02.9 ACUTE PHARYNGITIS, UNSPECIFIED: Chronic | Status: ACTIVE | Noted: 2019-01-23

## 2024-07-14 LAB
ADD ON TEST-SPECIMEN IN LAB: SIGNIFICANT CHANGE UP
ADD ON TEST-SPECIMEN IN LAB: SIGNIFICANT CHANGE UP
ALBUMIN SERPL ELPH-MCNC: 3.7 G/DL — SIGNIFICANT CHANGE UP (ref 3.3–5)
ALP SERPL-CCNC: 62 U/L — SIGNIFICANT CHANGE UP (ref 40–120)
ALT FLD-CCNC: 30 U/L — SIGNIFICANT CHANGE UP (ref 10–45)
ANION GAP SERPL CALC-SCNC: 12 MMOL/L — SIGNIFICANT CHANGE UP (ref 5–17)
APPEARANCE UR: CLEAR — SIGNIFICANT CHANGE UP
AST SERPL-CCNC: 39 U/L — SIGNIFICANT CHANGE UP (ref 10–40)
BACTERIA # UR AUTO: NEGATIVE /HPF — SIGNIFICANT CHANGE UP
BILIRUB SERPL-MCNC: 0.5 MG/DL — SIGNIFICANT CHANGE UP (ref 0.2–1.2)
BILIRUB UR-MCNC: NEGATIVE — SIGNIFICANT CHANGE UP
BUN SERPL-MCNC: 16 MG/DL — SIGNIFICANT CHANGE UP (ref 7–23)
CALCIUM SERPL-MCNC: 9.8 MG/DL — SIGNIFICANT CHANGE UP (ref 8.4–10.5)
CANCER AG125 SERPL-ACNC: 10 U/ML — SIGNIFICANT CHANGE UP
CANCER AG19-9 SERPL-ACNC: 7 U/ML — SIGNIFICANT CHANGE UP
CAST: 0 /LPF — SIGNIFICANT CHANGE UP (ref 0–4)
CEA SERPL-MCNC: 5.1 NG/ML — HIGH (ref 0–3.8)
CHLORIDE SERPL-SCNC: 104 MMOL/L — SIGNIFICANT CHANGE UP (ref 96–108)
CK SERPL-CCNC: 584 U/L — HIGH (ref 30–200)
CK SERPL-CCNC: 956 U/L — HIGH (ref 30–200)
CO2 SERPL-SCNC: 23 MMOL/L — SIGNIFICANT CHANGE UP (ref 22–31)
COLOR SPEC: YELLOW — SIGNIFICANT CHANGE UP
CREAT SERPL-MCNC: 0.62 MG/DL — SIGNIFICANT CHANGE UP (ref 0.5–1.3)
DIFF PNL FLD: NEGATIVE — SIGNIFICANT CHANGE UP
EGFR: 111 ML/MIN/1.73M2 — SIGNIFICANT CHANGE UP
GLUCOSE SERPL-MCNC: 176 MG/DL — HIGH (ref 70–99)
GLUCOSE UR QL: NEGATIVE MG/DL — SIGNIFICANT CHANGE UP
KETONES UR-MCNC: NEGATIVE MG/DL — SIGNIFICANT CHANGE UP
LEUKOCYTE ESTERASE UR-ACNC: NEGATIVE — SIGNIFICANT CHANGE UP
MAGNESIUM SERPL-MCNC: 1.6 MG/DL — SIGNIFICANT CHANGE UP (ref 1.6–2.6)
NITRITE UR-MCNC: NEGATIVE — SIGNIFICANT CHANGE UP
PH UR: 5.5 — SIGNIFICANT CHANGE UP (ref 5–8)
PHOSPHATE SERPL-MCNC: 3.1 MG/DL — SIGNIFICANT CHANGE UP (ref 2.5–4.5)
POTASSIUM SERPL-MCNC: 3.7 MMOL/L — SIGNIFICANT CHANGE UP (ref 3.5–5.3)
POTASSIUM SERPL-SCNC: 3.7 MMOL/L — SIGNIFICANT CHANGE UP (ref 3.5–5.3)
PROT SERPL-MCNC: 6.5 G/DL — SIGNIFICANT CHANGE UP (ref 6–8.3)
PROT UR-MCNC: 100 MG/DL
RBC CASTS # UR COMP ASSIST: 0 /HPF — SIGNIFICANT CHANGE UP (ref 0–4)
SODIUM SERPL-SCNC: 139 MMOL/L — SIGNIFICANT CHANGE UP (ref 135–145)
SP GR SPEC: >1.03 — HIGH (ref 1–1.03)
SQUAMOUS # UR AUTO: 0 /HPF — SIGNIFICANT CHANGE UP (ref 0–5)
TROPONIN T, HIGH SENSITIVITY RESULT: 69 NG/L — HIGH (ref 0–51)
TROPONIN T, HIGH SENSITIVITY RESULT: 75 NG/L — HIGH (ref 0–51)
TROPONIN T, HIGH SENSITIVITY RESULT: 83 NG/L — HIGH (ref 0–51)
TSH SERPL-MCNC: 1.08 UIU/ML — SIGNIFICANT CHANGE UP (ref 0.27–4.2)
UROBILINOGEN FLD QL: 1 MG/DL — SIGNIFICANT CHANGE UP (ref 0.2–1)
WBC UR QL: 0 /HPF — SIGNIFICANT CHANGE UP (ref 0–5)

## 2024-07-14 PROCEDURE — 99222 1ST HOSP IP/OBS MODERATE 55: CPT | Mod: GC

## 2024-07-14 PROCEDURE — 76705 ECHO EXAM OF ABDOMEN: CPT | Mod: 26

## 2024-07-14 PROCEDURE — 99222 1ST HOSP IP/OBS MODERATE 55: CPT

## 2024-07-14 RX ORDER — DEXLANSOPRAZOLE 60 MG
1 CAPSULE, DELAYED RELEASE, BIPHASIC ORAL
Refills: 0 | DISCHARGE

## 2024-07-14 RX ORDER — ONDANSETRON HYDROCHLORIDE 2 MG/ML
4 INJECTION INTRAMUSCULAR; INTRAVENOUS EVERY 8 HOURS
Refills: 0 | Status: DISCONTINUED | OUTPATIENT
Start: 2024-07-14 | End: 2024-07-17

## 2024-07-14 RX ORDER — MORPHINE SULFATE 100 MG/1
2 TABLET, EXTENDED RELEASE ORAL ONCE
Refills: 0 | Status: DISCONTINUED | OUTPATIENT
Start: 2024-07-14 | End: 2024-07-14

## 2024-07-14 RX ORDER — OXYCODONE HYDROCHLORIDE 100 MG/5ML
5 SOLUTION ORAL ONCE
Refills: 0 | Status: DISCONTINUED | OUTPATIENT
Start: 2024-07-14 | End: 2024-07-14

## 2024-07-14 RX ORDER — TRAMADOL HYDROCHLORIDE 50 MG/1
25 TABLET, FILM COATED ORAL EVERY 4 HOURS
Refills: 0 | Status: DISCONTINUED | OUTPATIENT
Start: 2024-07-14 | End: 2024-07-17

## 2024-07-14 RX ORDER — DEXTROSE MONOHYDRATE AND SODIUM CHLORIDE 5; .3 G/100ML; G/100ML
1000 INJECTION, SOLUTION INTRAVENOUS ONCE
Refills: 0 | Status: COMPLETED | OUTPATIENT
Start: 2024-07-14 | End: 2024-07-14

## 2024-07-14 RX ORDER — PANTOPRAZOLE SODIUM 40 MG/10ML
40 INJECTION, POWDER, FOR SOLUTION INTRAVENOUS
Refills: 0 | Status: DISCONTINUED | OUTPATIENT
Start: 2024-07-14 | End: 2024-07-17

## 2024-07-14 RX ORDER — MAGNESIUM, ALUMINUM HYDROXIDE 400-400
30 TABLET,CHEWABLE ORAL EVERY 4 HOURS
Refills: 0 | Status: DISCONTINUED | OUTPATIENT
Start: 2024-07-14 | End: 2024-07-17

## 2024-07-14 RX ORDER — MAGNESIUM SULFATE 100 %
2 POWDER (GRAM) MISCELLANEOUS ONCE
Refills: 0 | Status: COMPLETED | OUTPATIENT
Start: 2024-07-14 | End: 2024-07-14

## 2024-07-14 RX ORDER — ACETAMINOPHEN 325 MG
650 TABLET ORAL EVERY 6 HOURS
Refills: 0 | Status: DISCONTINUED | OUTPATIENT
Start: 2024-07-14 | End: 2024-07-17

## 2024-07-14 RX ADMIN — MORPHINE SULFATE 2 MILLIGRAM(S): 100 TABLET, EXTENDED RELEASE ORAL at 06:40

## 2024-07-14 RX ADMIN — DEXTROSE MONOHYDRATE AND SODIUM CHLORIDE 1000 MILLILITER(S): 5; .3 INJECTION, SOLUTION INTRAVENOUS at 16:46

## 2024-07-14 RX ADMIN — MORPHINE SULFATE 2 MILLIGRAM(S): 100 TABLET, EXTENDED RELEASE ORAL at 13:00

## 2024-07-14 RX ADMIN — MORPHINE SULFATE 2 MILLIGRAM(S): 100 TABLET, EXTENDED RELEASE ORAL at 11:21

## 2024-07-14 RX ADMIN — OXYCODONE HYDROCHLORIDE 5 MILLIGRAM(S): 100 SOLUTION ORAL at 16:39

## 2024-07-14 RX ADMIN — TRAMADOL HYDROCHLORIDE 25 MILLIGRAM(S): 50 TABLET, FILM COATED ORAL at 22:50

## 2024-07-14 RX ADMIN — Medication 25 GRAM(S): at 16:46

## 2024-07-14 RX ADMIN — MORPHINE SULFATE 2 MILLIGRAM(S): 100 TABLET, EXTENDED RELEASE ORAL at 06:00

## 2024-07-14 RX ADMIN — OXYCODONE HYDROCHLORIDE 5 MILLIGRAM(S): 100 SOLUTION ORAL at 01:10

## 2024-07-14 RX ADMIN — LIDOCAINE HCL 1 PATCH: 28 GEL TOPICAL at 10:30

## 2024-07-14 RX ADMIN — Medication 3 MILLIGRAM(S): at 22:52

## 2024-07-14 RX ADMIN — LIDOCAINE HCL 1 PATCH: 28 GEL TOPICAL at 07:56

## 2024-07-14 RX ADMIN — OXYCODONE HYDROCHLORIDE 5 MILLIGRAM(S): 100 SOLUTION ORAL at 18:29

## 2024-07-14 NOTE — PATIENT PROFILE ADULT - FALL HARM RISK - RISK INTERVENTIONS

## 2024-07-14 NOTE — PATIENT PROFILE ADULT - DO YOU FEEL UNSAFE AT HOME, WORK, OR SCHOOL?
Pt advised and dismissed in no acute distress. Pt verbalized understanding of d/c instructions and follow up.   
no

## 2024-07-14 NOTE — H&P ADULT - NSHPPHYSICALEXAM_GEN_ALL_CORE
Vital Signs Last 24 Hrs  T(C): 36.7 (14 Jul 2024 10:47), Max: 37 (13 Jul 2024 22:20)  T(F): 98.1 (14 Jul 2024 10:47), Max: 98.6 (13 Jul 2024 22:20)  HR: 87 (14 Jul 2024 10:47) (68 - 100)  BP: 151/78 (14 Jul 2024 10:47) (132/79 - 157/91)  BP(mean): --  RR: 18 (14 Jul 2024 10:47) (18 - 18)  SpO2: 96% (14 Jul 2024 10:47) (96% - 99%)    Parameters below as of 14 Jul 2024 10:47  Patient On (Oxygen Delivery Method): room air

## 2024-07-14 NOTE — H&P ADULT - PROBLEM SELECTOR PLAN 1
- obtain gallbladder ultrasound for further characterization  - GI consulted, will f/u further recommendations - obtain gallbladder ultrasound for further characterization  - obtain , CEA, CA 19-9  - GI consulted, will f/u further recommendations

## 2024-07-14 NOTE — H&P ADULT - ASSESSMENT
59 year old male with a PMHx of Renteria's esophagus (last EGD 2 years ago), diverticulitis s/p partial colectomy who presents with generalized body pain and 15-20 pound unintentional weight loss. He was found to have dilated intra and extra hepatic biliary ducts, dilated pancreatic duct without overt mass on CT. Blood work however otherwise negative for bilirubinemia. Pt is admitted for further work up.

## 2024-07-14 NOTE — H&P ADULT - PROBLEM SELECTOR PLAN 3
Without associated symptoms of ACS  - EKG showed T wave inversion in leads V3-V4, new from prior EKG from 2019  - TTE

## 2024-07-14 NOTE — CONSULT NOTE ADULT - ASSESSMENT
Mr. Mccoy is a 59 year old male with Renteria's esophagus (last EGD 2 years ago), diverticulitis s/p partial colectomy who presents with fatigue and weight loss, found to have biliary dilation on CT.     #Dilated CBD  #Dilated pancreatic duct  Patient with dilated CBD (12mm) and PD (8mm) on CT with symptoms of fatigue and ~15-20 lb weight loss over several weeks. No abdominal pain or jaundice. No FH of pancreatic cancer. No h/o smoking or drinking.    Recommendations:  - Tentative plan for EUS Monday to evaluate for pancreatic lesion pending schedule availability. Discussed with patient who is amenable  - NPO after midnight  - 4AM lab collection    Giovani Serrato MD  Gastroenterology/Hepatology Fellow  Available via [employer] Teams  Pager: [phone]    On Weekdays after 5 PM to 8AM and Weekends/Holidays (All day)  For non-urgent consults, please email [email or web address]  For urgent consults, please contact on call GI team.  See Amion schedule ([facility]), RECESS.ing system - [id number] ([facility]), or call hospital  ([facility])

## 2024-07-14 NOTE — H&P ADULT - NSHPLABSRESULTS_GEN_ALL_CORE
LABS:                         15.7   8.30  )-----------( 185      ( 13 Jul 2024 18:01 )             45.2     07-13    136  |  99  |  22  ----------------------------<  127<H>  4.4   |  25  |  0.86    Ca    10.4      13 Jul 2024 18:01    TPro  7.2  /  Alb  4.1  /  TBili  0.4  /  DBili  x   /  AST  54<H>  /  ALT  35  /  AlkPhos  70  07-13      Urinalysis Basic - ( 14 Jul 2024 00:51 )    Color: Yellow / Appearance: Clear / SG: >1.030 / pH: x  Gluc: x / Ketone: Negative mg/dL  / Bili: Negative / Urobili: 1.0 mg/dL   Blood: x / Protein: 100 mg/dL / Nitrite: Negative   Leuk Esterase: Negative / RBC: 0 /HPF / WBC 0 /HPF   Sq Epi: x / Non Sq Epi: 0 /HPF / Bacteria: Negative /HPF      CARDIAC MARKERS ( 13 Jul 2024 18:01 )  x     / x     / 956 U/L / x     / x              Records reviewed from prior hospitalization.  Labs reviewed remarkable for - CK elevated to 956. Bilirubin level wnl. AST slightly elevated at 54; ALT wnl. Normal renal function. Troponins elevated but flat 69 -> 83 -> 75  EKG personally reviewed - NSR with T wave inversions in leads V4-V6, new compared to EKG from 1/2019.

## 2024-07-14 NOTE — H&P ADULT - PROBLEM SELECTOR PLAN 2
Associated with rapid weight loss  - TSH wnl  - HIV nonreactive  - possibly associated with catabolic state for malignancy; TBD  - nutrition consult Associated with rapid weight loss  - TSH wnl  - HIV nonreactive  - work up for biliary dilation in progress  - nutrition consult

## 2024-07-14 NOTE — CONSULT NOTE ADULT - ATTENDING COMMENTS
Seen and examined, agree with above.   #CBD dilatation  #PD dilatation  In brief, 59M BE p/w fatigue and weight loss, imaging with CBD and PD dilatation, EUS recommended. CBC/CMP grossly unremarkable, lipase 119, CA 19-9 and  wnl, CEA mildly elevated (5.1, ULN 3.8). No EtOH or gallstone hx, no FHx panc cx or colon cx. Plan for EUS, tentatively tomorrow, NPO at MN.

## 2024-07-14 NOTE — H&P ADULT - NSICDXPASTMEDICALHX_GEN_ALL_CORE_FT
PAST MEDICAL HISTORY:  Renteria esophagus     Carpal tunnel syndrome     Diverticulitis     Hypertension     Sore throat evaluated 1/20/19 [facility] , strep negative , pt treated with augmentin start 1/20/19 for 7 day treatment

## 2024-07-14 NOTE — H&P ADULT - PROBLEM SELECTOR PLAN 5
- DVT ppx; ambulate as tolerated  - GI ppx: protonix  - Diet: will start regular diet after gallbladder ultrasound is completed

## 2024-07-14 NOTE — H&P ADULT - HISTORY OF PRESENT ILLNESS
59 year old male with a PMHx of Renteria's esophagus (last EGD 2 years ago), diverticulitis s/p partial colectomy who presents with generalized body pain, malaise. Pt was in his typical state of health until about 3-4 weeks ago when he had loss of appetite. He developed diffuse body aches, weight loss of 15-20 pounds. Pt is a  and typically very active; however, he found it difficult to even stand up straight.

## 2024-07-15 LAB
ALBUMIN SERPL ELPH-MCNC: 3.6 G/DL — SIGNIFICANT CHANGE UP (ref 3.3–5)
ALP SERPL-CCNC: 56 U/L — SIGNIFICANT CHANGE UP (ref 40–120)
ALT FLD-CCNC: 27 U/L — SIGNIFICANT CHANGE UP (ref 10–45)
ANION GAP SERPL CALC-SCNC: 13 MMOL/L — SIGNIFICANT CHANGE UP (ref 5–17)
AST SERPL-CCNC: 35 U/L — SIGNIFICANT CHANGE UP (ref 10–40)
BILIRUB SERPL-MCNC: 0.5 MG/DL — SIGNIFICANT CHANGE UP (ref 0.2–1.2)
BUN SERPL-MCNC: 13 MG/DL — SIGNIFICANT CHANGE UP (ref 7–23)
CALCIUM SERPL-MCNC: 9.6 MG/DL — SIGNIFICANT CHANGE UP (ref 8.4–10.5)
CHLORIDE SERPL-SCNC: 109 MMOL/L — HIGH (ref 96–108)
CK SERPL-CCNC: 473 U/L — HIGH (ref 30–200)
CO2 SERPL-SCNC: 24 MMOL/L — SIGNIFICANT CHANGE UP (ref 22–31)
CREAT SERPL-MCNC: 0.67 MG/DL — SIGNIFICANT CHANGE UP (ref 0.5–1.3)
CULTURE RESULTS: SIGNIFICANT CHANGE UP
EGFR: 108 ML/MIN/1.73M2 — SIGNIFICANT CHANGE UP
GLUCOSE SERPL-MCNC: 122 MG/DL — HIGH (ref 70–99)
HCT VFR BLD CALC: 44.1 % — SIGNIFICANT CHANGE UP (ref 39–50)
HGB BLD-MCNC: 14.8 G/DL — SIGNIFICANT CHANGE UP (ref 13–17)
INR BLD: 0.98 RATIO — SIGNIFICANT CHANGE UP (ref 0.85–1.18)
MAGNESIUM SERPL-MCNC: 1.7 MG/DL — SIGNIFICANT CHANGE UP (ref 1.6–2.6)
MCHC RBC-ENTMCNC: 32.2 PG — SIGNIFICANT CHANGE UP (ref 27–34)
MCHC RBC-ENTMCNC: 33.6 GM/DL — SIGNIFICANT CHANGE UP (ref 32–36)
MCV RBC AUTO: 96.1 FL — SIGNIFICANT CHANGE UP (ref 80–100)
NRBC # BLD: 0 /100 WBCS — SIGNIFICANT CHANGE UP (ref 0–0)
PHOSPHATE SERPL-MCNC: 2.7 MG/DL — SIGNIFICANT CHANGE UP (ref 2.5–4.5)
PLATELET # BLD AUTO: 166 K/UL — SIGNIFICANT CHANGE UP (ref 150–400)
POTASSIUM SERPL-MCNC: 4.2 MMOL/L — SIGNIFICANT CHANGE UP (ref 3.5–5.3)
POTASSIUM SERPL-SCNC: 4.2 MMOL/L — SIGNIFICANT CHANGE UP (ref 3.5–5.3)
PROT SERPL-MCNC: 6.3 G/DL — SIGNIFICANT CHANGE UP (ref 6–8.3)
PROTHROM AB SERPL-ACNC: 10.3 SEC — SIGNIFICANT CHANGE UP (ref 9.5–13)
RBC # BLD: 4.59 M/UL — SIGNIFICANT CHANGE UP (ref 4.2–5.8)
RBC # FLD: 12.4 % — SIGNIFICANT CHANGE UP (ref 10.3–14.5)
SODIUM SERPL-SCNC: 146 MMOL/L — HIGH (ref 135–145)
SPECIMEN SOURCE: SIGNIFICANT CHANGE UP
WBC # BLD: 6.29 K/UL — SIGNIFICANT CHANGE UP (ref 3.8–10.5)
WBC # FLD AUTO: 6.29 K/UL — SIGNIFICANT CHANGE UP (ref 3.8–10.5)

## 2024-07-15 PROCEDURE — 99232 SBSQ HOSP IP/OBS MODERATE 35: CPT

## 2024-07-15 PROCEDURE — 93306 TTE W/DOPPLER COMPLETE: CPT | Mod: 26

## 2024-07-15 PROCEDURE — 74183 MRI ABD W/O CNTR FLWD CNTR: CPT | Mod: 26

## 2024-07-15 PROCEDURE — 93356 MYOCRD STRAIN IMG SPCKL TRCK: CPT

## 2024-07-15 RX ORDER — METOPROLOL TARTRATE 50 MG
50 TABLET ORAL ONCE
Refills: 0 | Status: COMPLETED | OUTPATIENT
Start: 2024-07-16 | End: 2024-07-16

## 2024-07-15 RX ORDER — AMLODIPINE BESYLATE 2.5 MG/1
5 TABLET ORAL DAILY
Refills: 0 | Status: DISCONTINUED | OUTPATIENT
Start: 2024-07-15 | End: 2024-07-17

## 2024-07-15 RX ADMIN — TRAMADOL HYDROCHLORIDE 25 MILLIGRAM(S): 50 TABLET, FILM COATED ORAL at 23:51

## 2024-07-15 RX ADMIN — KETOROLAC TROMETHAMINE 15 MILLIGRAM(S): 30 INJECTION, SOLUTION INTRAMUSCULAR at 23:50

## 2024-07-15 RX ADMIN — TRAMADOL HYDROCHLORIDE 25 MILLIGRAM(S): 50 TABLET, FILM COATED ORAL at 13:14

## 2024-07-15 RX ADMIN — TRAMADOL HYDROCHLORIDE 25 MILLIGRAM(S): 50 TABLET, FILM COATED ORAL at 12:06

## 2024-07-15 RX ADMIN — Medication 3 MILLIGRAM(S): at 21:56

## 2024-07-15 RX ADMIN — OXYCODONE HYDROCHLORIDE 5 MILLIGRAM(S): 100 SOLUTION ORAL at 23:51

## 2024-07-15 RX ADMIN — TRAMADOL HYDROCHLORIDE 25 MILLIGRAM(S): 50 TABLET, FILM COATED ORAL at 16:12

## 2024-07-15 RX ADMIN — TRAMADOL HYDROCHLORIDE 25 MILLIGRAM(S): 50 TABLET, FILM COATED ORAL at 20:04

## 2024-07-15 RX ADMIN — MORPHINE SULFATE 4 MILLIGRAM(S): 100 TABLET, EXTENDED RELEASE ORAL at 23:50

## 2024-07-15 RX ADMIN — PANTOPRAZOLE SODIUM 40 MILLIGRAM(S): 40 INJECTION, POWDER, FOR SOLUTION INTRAVENOUS at 05:11

## 2024-07-15 RX ADMIN — Medication 1000 MILLIGRAM(S): at 23:50

## 2024-07-15 NOTE — PROGRESS NOTE ADULT - SUBJECTIVE AND OBJECTIVE BOX
PROGRESS NOTE:     Patient is a 59y old  Male who presents with a chief complaint of fatigue, dilated intra and extra hepatic biliary duct, dilated pancreatic duct (14 Jul 2024 16:46)      SUBJECTIVE / OVERNIGHT EVENTS:  No acute events overnight. Patient seen and evaluated at bedside. No fever/chills.  Denies SOB at rest, chest pain, palpitations, abdominal pain, nausea/vomiting    ADDITIONAL REVIEW OF SYSTEMS:    MEDICATIONS  (STANDING):  pantoprazole    Tablet 40 milliGRAM(s) Oral before breakfast    MEDICATIONS  (PRN):  acetaminophen     Tablet .. 650 milliGRAM(s) Oral every 6 hours PRN Temp greater or equal to 38C (100.4F), Mild Pain (1 - 3)  aluminum hydroxide/magnesium hydroxide/simethicone Suspension 30 milliLiter(s) Oral every 4 hours PRN Dyspepsia  melatonin 3 milliGRAM(s) Oral at bedtime PRN Insomnia  ondansetron Injectable 4 milliGRAM(s) IV Push every 8 hours PRN Nausea and/or Vomiting  traMADol 25 milliGRAM(s) Oral every 4 hours PRN Severe Pain (7 - 10)      CAPILLARY BLOOD GLUCOSE        I&O's Summary      PHYSICAL EXAM:  Vital Signs Last 24 Hrs  T(C): 36.4 (15 Jul 2024 11:55), Max: 36.5 (15 Jul 2024 04:41)  T(F): 97.6 (15 Jul 2024 11:55), Max: 97.7 (15 Jul 2024 04:41)  HR: 77 (15 Jul 2024 11:55) (77 - 86)  BP: 149/89 (15 Jul 2024 11:55) (143/91 - 149/89)  BP(mean): --  RR: 18 (15 Jul 2024 11:55) (18 - 18)  SpO2: 97% (15 Jul 2024 11:55) (95% - 97%)    Parameters below as of 15 Jul 2024 11:55  Patient On (Oxygen Delivery Method): room air        CONSTITUTIONAL: NAD, well-developed  RESPIRATORY: Normal respiratory effort; lungs are clear to auscultation bilaterally  CARDIOVASCULAR: Regular rate and rhythm, normal S1 and S2, no murmur/rub/gallop; No lower extremity edema; Peripheral pulses are 2+ bilaterally  ABDOMEN: Nontender to palpation, normoactive bowel sounds, no rebound/guarding; No hepatosplenomegaly  MUSCLOSKELETAL: no clubbing or cyanosis of digits; no joint swelling or tenderness to palpation  PSYCH: A+O to person, place, and time; affect appropriate    LABS:                        14.8   6.29  )-----------( 166      ( 15 Jul 2024 07:26 )             44.1     07-15    146<H>  |  109<H>  |  13  ----------------------------<  122<H>  4.2   |  24  |  0.67    Ca    9.6      15 Jul 2024 07:26  Phos  2.7     07-15  Mg     1.7     07-15    TPro  6.3  /  Alb  3.6  /  TBili  0.5  /  DBili  x   /  AST  35  /  ALT  27  /  AlkPhos  56  07-15    PT/INR - ( 15 Jul 2024 07:26 )   PT: 10.3 sec;   INR: 0.98 ratio           CARDIAC MARKERS ( 15 Jul 2024 07:26 )  x     / x     / 473 U/L / x     / x      CARDIAC MARKERS ( 14 Jul 2024 13:34 )  x     / x     / 584 U/L / x     / x      CARDIAC MARKERS ( 13 Jul 2024 18:01 )  x     / x     / 956 U/L / x     / x          Urinalysis Basic - ( 15 Jul 2024 07:26 )    Color: x / Appearance: x / SG: x / pH: x  Gluc: 122 mg/dL / Ketone: x  / Bili: x / Urobili: x   Blood: x / Protein: x / Nitrite: x   Leuk Esterase: x / RBC: x / WBC x   Sq Epi: x / Non Sq Epi: x / Bacteria: x        Culture - Urine (collected 14 Jul 2024 00:51)  Source: Clean Catch Clean Catch (Midstream)  Final Report (15 Jul 2024 06:22):    <10,000 CFU/mL Normal Urogenital Анна        RADIOLOGY & ADDITIONAL TESTS:  Results Reviewed:   Imaging Personally Reviewed:  Electrocardiogram Personally Reviewed:    COORDINATION OF CARE:  Care Discussed with Consultants/Other Providers [Y/N]:  Prior or Outpatient Records Reviewed [Y/N]:   PROGRESS NOTE:     Patient is a 59y old  Male who presents with a chief complaint of fatigue, dilated intra and extra hepatic biliary duct, dilated pancreatic duct (14 Jul 2024 16:46)      SUBJECTIVE / OVERNIGHT EVENTS: Patient seen and evaluated at bedside.  Denies SOB at rest, chest pain, palpitations, abdominal pain, nausea/vomiting    ADDITIONAL REVIEW OF SYSTEMS:    MEDICATIONS  (STANDING):  pantoprazole    Tablet 40 milliGRAM(s) Oral before breakfast    MEDICATIONS  (PRN):  acetaminophen     Tablet .. 650 milliGRAM(s) Oral every 6 hours PRN Temp greater or equal to 38C (100.4F), Mild Pain (1 - 3)  aluminum hydroxide/magnesium hydroxide/simethicone Suspension 30 milliLiter(s) Oral every 4 hours PRN Dyspepsia  melatonin 3 milliGRAM(s) Oral at bedtime PRN Insomnia  ondansetron Injectable 4 milliGRAM(s) IV Push every 8 hours PRN Nausea and/or Vomiting  traMADol 25 milliGRAM(s) Oral every 4 hours PRN Severe Pain (7 - 10)      CAPILLARY BLOOD GLUCOSE        I&O's Summary      PHYSICAL EXAM:  Vital Signs Last 24 Hrs  T(C): 36.4 (15 Jul 2024 11:55), Max: 36.5 (15 Jul 2024 04:41)  T(F): 97.6 (15 Jul 2024 11:55), Max: 97.7 (15 Jul 2024 04:41)  HR: 77 (15 Jul 2024 11:55) (77 - 86)  BP: 149/89 (15 Jul 2024 11:55) (143/91 - 149/89)  BP(mean): --  RR: 18 (15 Jul 2024 11:55) (18 - 18)  SpO2: 97% (15 Jul 2024 11:55) (95% - 97%)    Parameters below as of 15 Jul 2024 11:55  Patient On (Oxygen Delivery Method): room air        CONSTITUTIONAL: NAD  RESPIRATORY: Normal respiratory effort; lungs are clear to auscultation bilaterally  CARDIOVASCULAR: Regular rate and rhythm, normal S1 and S2, no murmur/rub/gallop; No lower extremity edema.  ABDOMEN: Nontender to palpation, normoactive bowel sounds, no rebound/guarding  MUSCLOSKELETAL: no clubbing or cyanosis of digits; no joint swelling or tenderness to palpation  PSYCH: A+O to person, place, and time; affect appropriate    LABS:                        14.8   6.29  )-----------( 166      ( 15 Jul 2024 07:26 )             44.1     07-15    146<H>  |  109<H>  |  13  ----------------------------<  122<H>  4.2   |  24  |  0.67    Ca    9.6      15 Jul 2024 07:26  Phos  2.7     07-15  Mg     1.7     07-15    TPro  6.3  /  Alb  3.6  /  TBili  0.5  /  DBili  x   /  AST  35  /  ALT  27  /  AlkPhos  56  07-15    PT/INR - ( 15 Jul 2024 07:26 )   PT: 10.3 sec;   INR: 0.98 ratio           CARDIAC MARKERS ( 15 Jul 2024 07:26 )  x     / x     / 473 U/L / x     / x      CARDIAC MARKERS ( 14 Jul 2024 13:34 )  x     / x     / 584 U/L / x     / x      CARDIAC MARKERS ( 13 Jul 2024 18:01 )  x     / x     / 956 U/L / x     / x          Urinalysis Basic - ( 15 Jul 2024 07:26 )    Color: x / Appearance: x / SG: x / pH: x  Gluc: 122 mg/dL / Ketone: x  / Bili: x / Urobili: x   Blood: x / Protein: x / Nitrite: x   Leuk Esterase: x / RBC: x / WBC x   Sq Epi: x / Non Sq Epi: x / Bacteria: x        Culture - Urine (collected 14 Jul 2024 00:51)  Source: Clean Catch Clean Catch (Midstream)  Final Report (15 Jul 2024 06:22):    <10,000 CFU/mL Normal Urogenital Анна        RADIOLOGY & ADDITIONAL TESTS:  Results Reviewed:   Imaging Personally Reviewed:  Electrocardiogram Personally Reviewed:    COORDINATION OF CARE:  Care Discussed with Consultants/Other Providers [Y/N]:  Prior or Outpatient Records Reviewed [Y/N]:

## 2024-07-15 NOTE — CHART NOTE - NSCHARTNOTEFT_GEN_A_CORE
Reviewed TTE, as follows < from: TTE W or WO Ultrasound Enhancing Agent (07.15.24 @ 12:57) >     1. Left ventricular cavity is normal in size. Left ventricular wall thickness is normal. Left ventricular systolic function is normal with anejection fraction of 42 % by Craig's method of disks. Regional wall motion abnormalities present.   2. Multiple segmental abnormalities exist. See findings.   3. There is mild (grade 1) left ventricular diastolic dysfunction, with normal left ventricular filling pressure.   4. Normal right ventricular cavity size, with normal wall thickness, and normal right ventricular systolic function.   5. Normal left and right atrial size.   6. No pericardial effusion seen.   7. Left ventricular global longitudinal strain is -14.2 % is abnormal (> -16%). Images were acquired on a [employer] ultrasound system and processed using Clutch strain analysis software with a heart rate of 87 bpm and a blood pressure of 142/91 mmHg.   8. There is no evidence of a left ventricular thrombus.      - reviewed HIE, no prior TTE available  - cardiologist Dr. Gates consulted, recs appreciated

## 2024-07-15 NOTE — CONSULT NOTE ADULT - SUBJECTIVE AND OBJECTIVE BOX
DATE OF SERVICE: 07-15-24     CHIEF COMPLAINT:Patient is a 59y old  Male who presents with a chief complaint of malaise, dilated intra and extra hepatic biliary duct, dilated pancreatic duct (15 Jul 2024 13:57)      HISTORY OF PRESENT ILLNESS:HPI:  59 year old male with a PMHx of Renteria's esophagus (last EGD 2 years ago), diverticulitis s/p partial colectomy who presents with generalized body pain, malaise. Pt was in his typical state of health until about 3-4 weeks ago when he had loss of appetite. He developed diffuse body aches, weight loss of 15-20 pounds. Pt is a  and typically very active; however, he found it difficult to even stand up straight.  (14 Jul 2024 11:49)      PAST MEDICAL & SURGICAL HISTORY:  Renteria esophagus      Carpal tunnel syndrome      Hypertension      Diverticulitis      Sore throat  evaluated 1/20/19 [facility] , strep negative , pt treated with augmentin start 1/20/19 for 7 day treatment      S/P colon resection  > 10 years ago      H/O carpal tunnel repair  right -2018              MEDICATIONS:        acetaminophen     Tablet .. 650 milliGRAM(s) Oral every 6 hours PRN  melatonin 3 milliGRAM(s) Oral at bedtime PRN  ondansetron Injectable 4 milliGRAM(s) IV Push every 8 hours PRN  traMADol 25 milliGRAM(s) Oral every 4 hours PRN    aluminum hydroxide/magnesium hydroxide/simethicone Suspension 30 milliLiter(s) Oral every 4 hours PRN  pantoprazole    Tablet 40 milliGRAM(s) Oral before breakfast          FAMILY HISTORY:      Non-contributory    SOCIAL HISTORY:    [ ] not a smoker    Allergies    No Known Allergies    Intolerances    	    REVIEW OF SYSTEMS:  CONSTITUTIONAL: No fever  EYES: No eye pain, visual disturbances, or discharge  ENMT:  No difficulty hearing, tinnitus  NECK: No pain or stiffness  RESPIRATORY: No cough, wheezing,  CARDIOVASCULAR: No chest pain, palpitations, passing out, dizziness, or leg swelling  GASTROINTESTINAL:  No nausea, vomiting, diarrhea or constipation. No melena.  GENITOURINARY: No dysuria, hematuria  NEUROLOGICAL: No stroke like symptoms  SKIN: No burning or lesions   ENDOCRINE: No heat or cold intolerance  MUSCULOSKELETAL: No joint pain or swelling  PSYCHIATRIC: No  anxiety, mood swings  HEME/LYMPH: No bleeding gums  ALLERGY AND IMMUNOLOGIC: No hives or eczema	    All other ROS negative    PHYSICAL EXAM:  T(C): 36.6 (07-15-24 @ 20:00), Max: 37.1 (07-15-24 @ 16:40)  HR: 86 (07-15-24 @ 20:00) (77 - 86)  BP: 148/84 (07-15-24 @ 20:00) (143/91 - 154/95)  RR: 18 (07-15-24 @ 20:00) (18 - 18)  SpO2: 96% (07-15-24 @ 20:00) (95% - 97%)  Wt(kg): --  I&O's Summary      Appearance: Normal	  HEENT:   Normal oral mucosa, EOMI	  Cardiovascular:  S1 S2, No JVD,    Respiratory: Lungs clear to auscultation	  Psychiatry: Alert  Gastrointestinal:  Soft, Non-tender, + BS	  Skin: No rashes   Neurologic: Non-focal  Extremities:  No edema  Vascular: Peripheral pulses palpable    	    	  	  CARDIAC MARKERS:  Labs personally reviewed by me                                  14.8   6.29  )-----------( 166      ( 15 Jul 2024 07:26 )             44.1     07-15    146<H>  |  109<H>  |  13  ----------------------------<  122<H>  4.2   |  24  |  0.67    Ca    9.6      15 Jul 2024 07:26  Phos  2.7     07-15  Mg     1.7     07-15    TPro  6.3  /  Alb  3.6  /  TBili  0.5  /  DBili  x   /  AST  35  /  ALT  27  /  AlkPhos  56  07-15          EKG: Personally reviewed by me - NSR, LVH, lateral wall TWI  Radiology: Personally reviewed by me - CT chest CHEST: LUNGS AND LARGE AIRWAYS: Patent central airways. No pulmonary nodules. Dependent atelectasis on the right.  PLEURA: No pleural effusion.  VESSELS: Within normal limits.  HEART: Heart size is normal. No pericardial effusion.  MEDIASTINUM AND SARAI: No lymphadenopathy.  CHEST WALL AND LOWER NECK: Within normal limits.      TTE CONCLUSIONS:      1. Left ventricular cavity is normal in size. Left ventricular wall thickness is normal. Left ventricular systolic function is normal with anejection fraction of 42 % by Craig's method of disks. Regional wall motion abnormalities present.   2. Multiple segmental abnormalities exist. See findings.   3. There is mild (grade 1) left ventricular diastolic dysfunction, with normal left ventricular filling pressure.   4. Normal right ventricular cavity size, with normal wall thickness, and normal right ventricular systolic function.   5. Normal left and right atrial size.   6. No pericardial effusion seen.   7. Left ventricular global longitudinal strain is -14.2 % is abnormal (> -16%). Images were acquired on a Golden ultrasound system and processed using [employer] strain analysis software with a heart rate of 87 bpm and a blood pressure of 142/91 mmHg.   8. There is no evidence of a left ventricular thrombus.        Assessment and Plan:   59 year old male with a PMHx of Renteria's esophagus (last EGD 2 years ago), diverticulitis s/p partial colectomy who presents with generalized body pain and 15-20 pound unintentional weight loss. He was found to have dilated intra and extra hepatic biliary ducts, dilated pancreatic duct without overt mass on CT. Blood work however otherwise negative for bilirubinemia. Pt is admitted for further work up.      Problem/Plan - 1:  ·  Problem: Abnormal Echo  ·  Plan: LVEF of 42 %. Regional wall motion abnormalities present.  - CTA heart to define coronary anatomy, will plan for cath if obstructive disease present  - CAD vs hypertensive heart disease 2/2 long standing uncontrolled HTN    Problem/Plan - 2:  ·  Problem: HTN  ·  Plan: Start Amlodipine 5mg daily    Problem/Plan - 3:  ·  Problem: Elevated troponin.   ·  Plan: Without associated symptoms of ACS  - Plan for CTA heart as noted above    Problem/Plan - 4:  ·  Problem: Prophylactic measure.   ·  Plan: - DVT ppx; ambulate as tolerated               Differential diagnosis and plan of care discussed with patient after the evaluation. Counseling on diet, nutritional counseling, weight management, exercise and medication compliance was done.   Advanced care planning/advanced directives discussed with patient/family. DNR status including forceful chest compressions to attempt to restart the heart, ventilator support/artificial breathing, electric shock, artificial nutrition, health care proxy, Molst form all discussed with pt. Pt wishes to consider. Sixteen minutes spent on discussing advanced directives.           Kareem Gates DO [facility]  Cardiovascular Medicine  [address] [place] [address], [address]  Office [phone]  Available via call/text on Microsoft Teams
Chief Complaint:  fatigue    HPI:    Mr. Mccoy is a 59 year old male with Renteria's esophagus (last EGD 2 years ago), diverticulitis s/p partial colectomy who presents with fatigue for the past week. Pt was in his typical state of health until about 3-4 weeks ago when he had loss of appetite and weight loss of ~15-20 pounds. Pt is a  and typically very active; however, he found it difficult to even stand up straight.  He denies fever, chills, diaphoresis, abdominal pain, nausea, vomiting, dysphagia, jaundice, smoking, drinking or family history of pancreatic cancer.     Upon arrival, CT noted dilation of the bile ducts and pancreatic duct.     Allergies:  No Known Allergies      Hospital Medications:  acetaminophen     Tablet .. 650 milliGRAM(s) Oral every 6 hours PRN  aluminum hydroxide/magnesium hydroxide/simethicone Suspension 30 milliLiter(s) Oral every 4 hours PRN  melatonin 3 milliGRAM(s) Oral at bedtime PRN  ondansetron Injectable 4 milliGRAM(s) IV Push every 8 hours PRN  pantoprazole    Tablet 40 milliGRAM(s) Oral before breakfast  traMADol 25 milliGRAM(s) Oral every 4 hours PRN      PMHX/PSHX:  Hypertension    Renteria esophagus    Carpal tunnel syndrome    Hypertension    Diverticulitis    Sore throat    No significant past surgical history    S/P colon resection    H/O carpal tunnel repair    Family history:  No pertinent family history in first degree relatives    Social History:   No alcohol or smoking    ROS:   See HPI    PHYSICAL EXAM:   GENERAL:  NAD, resting comfortably in bed  HEENT:  Sclera anicteric  RESPIRATORY:  Normal effort  CARDIAC:  HDS  ABDOMEN:  Soft, non-tender, non-distended  EXTREMITIES:  No edema  SKIN:  Warm & Dry. No jaundice.   NEURO:  Alert, conversant, no focal deficit    Vital Signs:  Vital Signs Last 24 Hrs  T(C): 36.7 (14 Jul 2024 10:47), Max: 37 (13 Jul 2024 22:20)  T(F): 98.1 (14 Jul 2024 10:47), Max: 98.6 (13 Jul 2024 22:20)  HR: 87 (14 Jul 2024 10:47) (68 - 87)  BP: 151/78 (14 Jul 2024 10:47) (132/79 - 157/91)  BP(mean): --  RR: 18 (14 Jul 2024 10:47) (18 - 18)  SpO2: 96% (14 Jul 2024 10:47) (96% - 99%)    Parameters below as of 14 Jul 2024 10:47  Patient On (Oxygen Delivery Method): room air      Daily     Daily     LABS:                        15.7   8.30  )-----------( 185      ( 13 Jul 2024 18:01 )             45.2     Mean Cell Volume: 94.2 fl (07-13-24 @ 18:01)    07-14    139  |  104  |  16  ----------------------------<  176<H>  3.7   |  23  |  0.62    Ca    9.8      14 Jul 2024 13:34  Phos  3.1     07-14  Mg     1.6     07-14    TPro  6.5  /  Alb  3.7  /  TBili  0.5  /  DBili  x   /  AST  39  /  ALT  30  /  AlkPhos  62  07-14    LIVER FUNCTIONS - ( 14 Jul 2024 13:34 )  Alb: 3.7 g/dL / Pro: 6.5 g/dL / ALK PHOS: 62 U/L / ALT: 30 U/L / AST: 39 U/L / GGT: x             Urinalysis Basic - ( 14 Jul 2024 13:34 )    Color: x / Appearance: x / SG: x / pH: x  Gluc: 176 mg/dL / Ketone: x  / Bili: x / Urobili: x   Blood: x / Protein: x / Nitrite: x   Leuk Esterase: x / RBC: x / WBC x   Sq Epi: x / Non Sq Epi: x / Bacteria: x      Amylase Serum--      Lipase [id number]       Ammonia--                          15.7   8.30  )-----------( 185      ( 13 Jul 2024 18:01 )             45.2     Imaging:    < from: CT Abdomen and Pelvis w/ IV Cont (07.13.24 @ 22:57) >  IMPRESSION:  Intrahepatic/extrahepatic biliary ductal dilatation with additional   dilated pancreatic duct present, without identifiable mass lesion.   MRI/MRCP recommended for further evaluation.    < end of copied text >    < from: US Abdomen Upper Quadrant Right (07.14.24 @ 12:37) >  IMPRESSION:  Persistent pancreatic and biliary ductal dilatation. Recommend further   evaluation with endoscopic ultrasound to exclude ampullary or head of the   pancreas lesion.    < end of copied text >

## 2024-07-15 NOTE — CHART NOTE - NSCHARTNOTEFT_GEN_A_CORE
Chart reviewed. Case reviewed with advanced endoscopy attending this AM.     Recommendations:  - Please perform MRCP   - Will consider EUS pending MRCP findings  - No plans for GI procedure today. okay for rajinder Serrato MD  Gastroenterology/Hepatology Fellow

## 2024-07-15 NOTE — CONSULT NOTE ADULT - REASON FOR ADMISSION
fatigue, dilated intra and extra hepatic biliary duct, dilated pancreatic duct
malaise, dilated intra and extra hepatic biliary duct, dilated pancreatic duct

## 2024-07-16 LAB
BILIRUB SERPL-MCNC: 0.3 MG/DL — SIGNIFICANT CHANGE UP (ref 0.2–1.2)
BLD GP AB SCN SERPL QL: NEGATIVE — SIGNIFICANT CHANGE UP
CREAT SERPL-MCNC: 0.76 MG/DL — SIGNIFICANT CHANGE UP (ref 0.5–1.3)
EGFR: 104 ML/MIN/1.73M2 — SIGNIFICANT CHANGE UP
INR BLD: 1.03 RATIO — SIGNIFICANT CHANGE UP (ref 0.85–1.18)
MELD SCORE WITH DIALYSIS: 20 POINTS — SIGNIFICANT CHANGE UP
MELD SCORE WITHOUT DIALYSIS: 7 POINTS — SIGNIFICANT CHANGE UP
PROTHROM AB SERPL-ACNC: 10.8 SEC — SIGNIFICANT CHANGE UP (ref 9.5–13)
RH IG SCN BLD-IMP: NEGATIVE — SIGNIFICANT CHANGE UP
SODIUM SERPL-SCNC: 143 MMOL/L — SIGNIFICANT CHANGE UP (ref 135–145)

## 2024-07-16 PROCEDURE — 99232 SBSQ HOSP IP/OBS MODERATE 35: CPT | Mod: GC

## 2024-07-16 PROCEDURE — 99233 SBSQ HOSP IP/OBS HIGH 50: CPT

## 2024-07-16 PROCEDURE — 75574 CT ANGIO HRT W/3D IMAGE: CPT | Mod: 26

## 2024-07-16 RX ADMIN — TRAMADOL HYDROCHLORIDE 25 MILLIGRAM(S): 50 TABLET, FILM COATED ORAL at 03:27

## 2024-07-16 RX ADMIN — Medication 3 MILLIGRAM(S): at 19:32

## 2024-07-16 RX ADMIN — AMLODIPINE BESYLATE 5 MILLIGRAM(S): 2.5 TABLET ORAL at 06:25

## 2024-07-16 RX ADMIN — TRAMADOL HYDROCHLORIDE 25 MILLIGRAM(S): 50 TABLET, FILM COATED ORAL at 20:32

## 2024-07-16 RX ADMIN — TRAMADOL HYDROCHLORIDE 25 MILLIGRAM(S): 50 TABLET, FILM COATED ORAL at 19:32

## 2024-07-16 RX ADMIN — PANTOPRAZOLE SODIUM 40 MILLIGRAM(S): 40 INJECTION, POWDER, FOR SOLUTION INTRAVENOUS at 06:26

## 2024-07-16 RX ADMIN — Medication 50 MILLIGRAM(S): at 09:57

## 2024-07-16 NOTE — DIETITIAN INITIAL EVALUATION ADULT - ORAL NUTRITION SUPPLEMENTS
Add Ensure Plus High Protein (Provides 20g PRO, 350 Nestor per serving) 1x/day to optimize protein/caloric intake.

## 2024-07-16 NOTE — PROGRESS NOTE ADULT - PROBLEM SELECTOR PLAN 2
Associated with rapid weight loss  - TSH wnl  - HIV nonreactive  - work up for biliary dilation in progress per above   - nutrition consult
Associated with rapid weight loss  - TSH wnl  - HIV nonreactive  - work up for biliary dilation in progress per above   - nutrition consult.;

## 2024-07-16 NOTE — DIETITIAN INITIAL EVALUATION ADULT - NSICDXPASTMEDICALHX_GEN_ALL_CORE_FT
PAST MEDICAL HISTORY:  Renteria esophagus     Carpal tunnel syndrome     Diverticulitis     Hypertension     Sore throat evaluated 1/20/19 [facility] , strep negative , pt treated with augmentin start 1/20/19 for 7 day treatment

## 2024-07-16 NOTE — DIETITIAN INITIAL EVALUATION ADULT - OTHER INFO
- Abnormal CT of abdomen; noted persistent pancreatic and biliary ductal dilatation; MRCP reveals mild CBD and pancreatic ductal dilatation to level of the ampulla. Per internal med, low suspicion for malignancy.     Wt Hx:  - Pt reports UBW of 93.2 - 95.5 kg, endorses wt loss of 15-20lbs (6.8 - 9.1 kg) secondary to ongoing poor PO intake PTA. Per chart, dosing wt of 90.7 kg (7/13).   - Wt hx in kg ([facility]) as follows: 102.1 (2/1/19), 94.3 (6/2/17), 95.3 (9/29/16). Suspected wt loss of 5% x ~ 1 mos (clinically significant, based on upper range of stated UBW and 7/13 wt). RD will continue to monitor weight trends as available/able.   - IBW: 80.9 kg (based on ht of 72 in - reported per pt)

## 2024-07-16 NOTE — PROGRESS NOTE ADULT - ASSESSMENT
Mr. Mccoy is a 59 year old male with Renteria's esophagus (last EGD 2 years ago), diverticulitis s/p partial colectomy who presents with fatigue and weight loss, found to have biliary dilation on CT.     #Dilated CBD  #Dilated pancreatic duct  Patient with dilated CBD (12mm) and PD (8mm) on CT with symptoms of fatigue and ~15-20 lb weight loss over several weeks. No abdominal pain or jaundice. No FH of pancreatic cancer. No h/o smoking or drinking. MRCP reviewed showing     Recommendations:  - Patient will need documented cardiology clearance prior to endoscopic intervention   - Tentative plan for EUS with possible biopsy tomorrow pending cardiac clearance   - NPO after midnight  - 4AM lab collection with electrolyte correction     Machelle Carpenter MD  Gastroenterology/Hepatology Fellow, PGY-5  Please contact via TEAMS    NON-URGENT CONSULTS:  Please email [email or web address]   Mr. Mccoy is a 59 year old male with Renteria's esophagus (last EGD 2 years ago), diverticulitis s/p partial colectomy who presents with fatigue and weight loss, found to have biliary dilation on CT.     #Dilated CBD  #Dilated pancreatic duct  Patient with dilated CBD (12mm) and PD (8mm) on CT with symptoms of fatigue and ~15-20 lb weight loss over several weeks. No abdominal pain or jaundice. No FH of pancreatic cancer. No h/o smoking or drinking. MRCP reviewed showing mild CBD and pancreatic ductal dilatation to level of the ampulla.     Recommendations:  - Patient will need documented cardiology clearance prior to endoscopic intervention   - Tentative plan for EUS with possible biopsy tomorrow pending cardiac clearance   - NPO after midnight  - 4AM lab collection with electrolyte correction     Machelle Carpenter MD  Gastroenterology/Hepatology Fellow, PGY-5  Please contact via TEAMS    NON-URGENT CONSULTS:  Please email [email or web address]

## 2024-07-16 NOTE — DIETITIAN INITIAL EVALUATION ADULT - EDUCATION DIETARY MODIFICATIONS
Educated on the importance of consuming a diet adequate in protein rich food sources; encouraged prioritizing protein foods first at meal times; recommended small, frequent nutrient dense meals. Discussed the benefits of oral nutrition supplementation; recommended having small sips in between meals to optimize protein intake. Provided examples of ways to increase calories in meals. Pt receptive to diet education and made aware RD remains available upon request./teach back/(2) meets goals/outcomes/verbalization

## 2024-07-16 NOTE — DIETITIAN INITIAL EVALUATION ADULT - ADD RECOMMEND
[X] Continue with Regular Diet to optimize PO intake; Defer texture/consistency to Speech Language Pathologist prn.   [X] Consider adding multivitamin once daily for micronutrient coverage, pending no medical contraindications.   [X] Malnutrition sticker placed in chart   [X] RD will continue to monitor PO intake, GI tolerance, weight trends, skin integrity, BMs, labs/electrolytes prn.   RD remains available upon request.

## 2024-07-16 NOTE — DIETITIAN INITIAL EVALUATION ADULT - ORAL INTAKE PTA/DIET HISTORY
Per pt PTA: Reports decreased appetite/PO intake x ~1 mos secondary to general body pain/malaise and worsening fatigue, consuming 1-2x meals/day. Endorses pt does not follow any therapeutic diets. Reports no micronutrient supplementation; endorses preparing protein shakes with whey powder likely ~1x/day. No known food allergies/intolerances reported. No chewing/swallowing difficulties reported at this time. Noted hx of Renteria Esophagus (EGD 2 years ago).  Per pt PTA: Reports decreased appetite/PO intake x ~1 mos secondary to general body pain/malaise and worsening fatigue, consuming 1-2x meals/day. Endorses pt does not follow any therapeutic diets. Reports no micronutrient supplementation; endorses preparing protein shakes with whey powder likely ~1x/day. No known food allergies/intolerances reported. No chewing/swallowing difficulties reported at this time. Noted hx of Renteria Esophagus (EGD 2 years ago) and diverticulitis s/p partial colectomy.

## 2024-07-16 NOTE — DIETITIAN INITIAL EVALUATION ADULT - PROBLEM SELECTOR PLAN 1
- obtain gallbladder ultrasound for further characterization  - obtain , CEA, CA 19-9  - GI consulted, will f/u further recommendations

## 2024-07-16 NOTE — PROGRESS NOTE ADULT - SUBJECTIVE AND OBJECTIVE BOX
DATE OF SERVICE: 07-16-24 @ 23:34    Patient is a 59y old  Male who presents with a chief complaint of malaise, dilated intra and extra hepatic biliary duct, dilated pancreatic duct (16 Jul 2024 20:47)      INTERVAL HISTORY: feels ok    TELEMETRY Personally reviewed: no events  	  MEDICATIONS:  amLODIPine   Tablet 5 milliGRAM(s) Oral daily        PHYSICAL EXAM:  T(C): 36.6 (07-16-24 @ 21:00), Max: 36.8 (07-16-24 @ 04:44)  HR: 81 (07-16-24 @ 21:00) (63 - 84)  BP: 138/76 (07-16-24 @ 21:00) (125/77 - 142/91)  RR: 18 (07-16-24 @ 21:00) (18 - 18)  SpO2: 97% (07-16-24 @ 21:00) (95% - 97%)  Wt(kg): --  I&O's Summary        Appearance: In no distress	  HEENT:    PERRL, EOMI	  Cardiovascular:  S1 S2, No JVD  Respiratory: Lungs clear to auscultation	  Gastrointestinal:  Soft, Non-tender, + BS	  Vascularature:  No edema of LE  Psychiatric: Appropriate affect   Neuro: no acute focal deficits                               14.8   6.29  )-----------( 166      ( 15 Jul 2024 07:26 )             44.1     07-16    143  |  x   |  x   ----------------------------<  x   x    |  x   |  0.76    Ca    9.6      15 Jul 2024 07:26  Phos  2.7     07-15  Mg     1.7     07-15    TPro  x   /  Alb  x   /  TBili  0.3  /  DBili  x   /  AST  x   /  ALT  x   /  AlkPhos  x   07-16        Labs personally reviewed      EKG: Personally reviewed by me - NSR, LVH, lateral wall TWI  Radiology: Personally reviewed by me - CT chest CHEST: LUNGS AND LARGE AIRWAYS: Patent central airways. No pulmonary nodules. Dependent atelectasis on the right.  PLEURA: No pleural effusion.  VESSELS: Within normal limits.  HEART: Heart size is normal. No pericardial effusion.  MEDIASTINUM AND SARAI: No lymphadenopathy.  CHEST WALL AND LOWER NECK: Within normal limits.      TTE CONCLUSIONS:      1. Left ventricular cavity is normal in size. Left ventricular wall thickness is normal. Left ventricular systolic function is normal with anejection fraction of 42 % by Craig's method of disks. Regional wall motion abnormalities present.   2. Multiple segmental abnormalities exist. See findings.   3. There is mild (grade 1) left ventricular diastolic dysfunction, with normal left ventricular filling pressure.   4. Normal right ventricular cavity size, with normal wall thickness, and normal right ventricular systolic function.   5. Normal left and right atrial size.   6. No pericardial effusion seen.   7. Left ventricular global longitudinal strain is -14.2 % is abnormal (> -16%). Images were acquired on a Golden ultrasound system and processed using [employer] strain analysis software with a heart rate of 87 bpm and a blood pressure of 142/91 mmHg.   8. There is no evidence of a left ventricular thrombus.        Assessment and Plan:   59 year old male with a PMHx of Renteria's esophagus (last EGD 2 years ago), diverticulitis s/p partial colectomy who presents with generalized body pain and 15-20 pound unintentional weight loss. He was found to have dilated intra and extra hepatic biliary ducts, dilated pancreatic duct without overt mass on CT. Blood work however otherwise negative for bilirubinemia. Pt is admitted for further work up.      Problem/Plan - 1:  ·  Problem: Abnormal Echo  ·  Plan: LVEF of 42 %. Regional wall motion abnormalities present.  - CTA heart to define coronary anatomy, with mild disease only  - likely hypertensive heart disease 2/2 long standing uncontrolled HTN    Problem/Plan - 2:  ·  Problem: HTN  ·  Plan: Started Amlodipine 5mg daily    Problem/Plan - 3:  ·  Problem: Elevated troponin.   ·  Plan: Without associated symptoms of ACS  - s/p CTA heart as noted above    Problem/Plan - 4:  ·  Problem: Preop Risk Stratification  ·  Plan: Mod risk for low- mod risk GI endoscopic procedure, no contraindication to proceed          Kareem Gates DO [facility]  Cardiovascular Medicine  [address], [address]  Office: [phone]  Available via Text/call on Microsoft Teams

## 2024-07-16 NOTE — PROGRESS NOTE ADULT - NS ATTEND AMEND GEN_ALL_CORE FT
Patient care and plan discussed and reviewed with Advanced Care Provider. Plan as outlined above edited by me to reflect our discussion. I had a prolonged conversation with the patient/family regarding hospital course, differential diagnosis and results of diagnostic tests.  Plan of care discussed with patient/family after the evaluation. Patient/family express clear understanding and satisfaction with the plan of care.  Fifty one minutes spent on encounter, of which more than fifty percent of the encounter was spent on counseling and/or coordinating care by the attending physician.

## 2024-07-16 NOTE — DIETITIAN INITIAL EVALUATION ADULT - ENERGY INTAKE
Fair (50-75%) - Endorses improving appetite/PO intake since being admitted; consuming >50% of meals today. Amenable to having Ensure Plus High Protein (Provides 20g PRO, 350 Nestor per serving) 1x/day.  - Endorses improving appetite/PO intake since being admitted; consuming >50% of meals today with snacks in-between. Amenable to having Ensure Plus High Protein (Provides 20g PRO, 350 Nestor per serving) 1x/day.

## 2024-07-16 NOTE — PROGRESS NOTE ADULT - SUBJECTIVE AND OBJECTIVE BOX
[facility] Division of Hospital Medicine  Celso Tran DO  Pager (M-F, 8A-5P):  MS Teams PREFERRED        SUBJECTIVE / OVERNIGHT EVENTS:  Patient seen at bedside during morning rounds, accompanied by wife at bedside  Expresses frustration with not knowing clear plan from time in ER.  Sat down with patient and discussed imaging findings thus far and need to await MRCP result prior to further management. Verbalized understanding  He is aware of his reduced EF and aware of cardiac plan for CTCA and potential catheterization.   Discussed that will page GI with MRCP result for followup and definitive management/EUS.       MEDICATIONS  (STANDING):  amLODIPine   Tablet 5 milliGRAM(s) Oral daily  pantoprazole    Tablet 40 milliGRAM(s) Oral before breakfast    MEDICATIONS  (PRN):  acetaminophen     Tablet .. 650 milliGRAM(s) Oral every 6 hours PRN Temp greater or equal to 38C (100.4F), Mild Pain (1 - 3)  aluminum hydroxide/magnesium hydroxide/simethicone Suspension 30 milliLiter(s) Oral every 4 hours PRN Dyspepsia  melatonin 3 milliGRAM(s) Oral at bedtime PRN Insomnia  ondansetron Injectable 4 milliGRAM(s) IV Push every 8 hours PRN Nausea and/or Vomiting  traMADol 25 milliGRAM(s) Oral every 4 hours PRN Severe Pain (7 - 10)      I&O's Summary      PHYSICAL EXAM:  Vital Signs Last 24 Hrs  T(C): 36.8 (16 Jul 2024 04:44), Max: 37.1 (15 Jul 2024 16:40)  T(F): 98.2 (16 Jul 2024 04:44), Max: 98.8 (15 Jul 2024 16:40)  HR: 63 (16 Jul 2024 13:06) (63 - 86)  BP: 125/77 (16 Jul 2024 13:06) (125/77 - 154/95)  BP(mean): --  RR: 18 (16 Jul 2024 04:44) (18 - 18)  SpO2: 95% (16 Jul 2024 04:44) (95% - 96%)    Parameters below as of 16 Jul 2024 04:44  Patient On (Oxygen Delivery Method): room air        LABS:                        14.8   6.29  )-----------( 166      ( 15 Jul 2024 07:26 )             44.1     07-16    143  |  x   |  x   ----------------------------<  x   x    |  x   |  0.76    Ca    9.6      15 Jul 2024 07:26  Phos  2.7     07-15  Mg     1.7     07-15    TPro  x   /  Alb  x   /  TBili  0.3  /  DBili  x   /  AST  x   /  ALT  x   /  AlkPhos  x   07-16    PT/INR - ( 16 Jul 2024 07:10 )   PT: 10.8 sec;   INR: 1.03 ratio           CARDIAC MARKERS ( 15 Jul 2024 07:26 )  x     / x     / 473 U/L / x     / x      CARDIAC MARKERS ( 14 Jul 2024 13:34 )  x     / x     / 584 U/L / x     / x          Urinalysis Basic - ( 15 Jul 2024 07:26 )    Color: x / Appearance: x / SG: x / pH: x  Gluc: 122 mg/dL / Ketone: x  / Bili: x / Urobili: x   Blood: x / Protein: x / Nitrite: x   Leuk Esterase: x / RBC: x / WBC x   Sq Epi: x / Non Sq Epi: x / Bacteria: x        Culture - Urine (collected 14 Jul 2024 00:51)  Source: Clean Catch Clean Catch (Midstream)  Final Report (15 Jul 2024 06:22):    <10,000 CFU/mL Normal Urogenital Анна        RADIOLOGY & ADDITIONAL TESTS:  Results Reviewed: CBC/CMP personally reviewed by me Hgb 14.8 Cr 0.76  Imaging Personally Reviewed:  Electrocardiogram Personally Reviewed:    COORDINATION OF CARE:  Care Discussed with Consultants/Other Providers [Y/N]: Y  Prior or Outpatient Records Reviewed [Y/N]: Y

## 2024-07-16 NOTE — DIETITIAN INITIAL EVALUATION ADULT - PROBLEM SELECTOR PLAN 2
Associated with rapid weight loss  - TSH wnl  - HIV nonreactive  - work up for biliary dilation in progress  - nutrition consult

## 2024-07-16 NOTE — PROGRESS NOTE ADULT - PROBLEM SELECTOR PLAN 4
- takes Dexilant at home; continue with protonix while admitted
- takes Dexilant at home; continue with protonix while admitted.

## 2024-07-16 NOTE — DIETITIAN INITIAL EVALUATION ADULT - REASON INDICATOR FOR ASSESSMENT
RD Consult Indicated for: MST Score 2 or >; Nutrition Assessment  Source: Pt, Team, Electronic Medical Record   Chart reviewed, events noted.

## 2024-07-16 NOTE — DIETITIAN INITIAL EVALUATION ADULT - PERTINENT LABORATORY DATA
07-16    143  |  x   |  x   ----------------------------<  x   x    |  x   |  0.76    Ca    9.6      15 Jul 2024 07:26  Phos  2.7     07-15  Mg     1.7     07-15    TPro  x   /  Alb  x   /  TBili  0.3  /  DBili  x   /  AST  x   /  ALT  x   /  AlkPhos  x   07-16

## 2024-07-16 NOTE — PROGRESS NOTE ADULT - PROBLEM SELECTOR PLAN 1
- Reviewed RUQ US Persistent pancreatic and biliary ductal dilatation. Recommend further evaluation with endoscopic ultrasound to exclude ampullary or head of the pancreas lesion.  -  10 WNL , CEA 5.1 elevated, CA 19-9 7 WNL  - GI consulted, recommend MRCP, based on findings consider EUS
- Reviewed RUQ US Persistent pancreatic and biliary ductal dilatation. Recommend further evaluation with endoscopic ultrasound to exclude ampullary or head of the pancreas lesion.  -  10 WNL , CEA 5.1 elevated, CA 19-9 7 WNL  - GI consulted, MRCP performed--> Mild CBD and pancreatic ductal dilatation to level of the ampulla.   Questionable filling defect of the distal CBD near the ampulla. Low suspicion for malignancy. Endoscopic correlation is suggested to rule out ampullary/periampullary lesion.   Will discuss GI for role of EUS.

## 2024-07-16 NOTE — PROGRESS NOTE ADULT - PROBLEM SELECTOR PLAN 3
Without associated symptoms of ACS  - EKG showed T wave inversion in leads V3-V4, new from prior EKG from 2019  TTE--> EF 42%.   Cardiology consulted. Recommend CTCA to evaluate for ischemic cardiomyopathy. If positive will explore possible cath.
Without associated symptoms of ACS  - EKG showed T wave inversion in leads V3-V4, new from prior EKG from 2019  - f/u TTE

## 2024-07-16 NOTE — DIETITIAN INITIAL EVALUATION ADULT - NSFNSGIASSESSMENTFT_GEN_A_CORE
No N/V reported today; ordered for odansetron. No diarrhea/constipation reported; last BM not documented. Not ordered for bowel regimen.  Ordered for Aluminum Hydroxide; Magnesium Hydroxide; Simethicone Suspension, PPI.

## 2024-07-16 NOTE — PROGRESS NOTE ADULT - ATTENDING COMMENTS
As above.    Patient seen and examined on 7/16/2024 in the early evening.  Discussed with GI fellow earlier in the day.    Impression:    #1.  Admitted with fatigue and weight loss, consulted for dilated common bile duct measuring up to 12 mm and dilated pancreatic duct measuring up to 8 mm on CT scan, MRI/MRCP demonstrated dilated common bile duct of 10 mm with questionable filling defect in the distal common bile duct.  Also has mild dilation of the pancreatic duct.    #2.  Normal LFTs    #3.  Regional wall motion abnormalities of the left ventricle echocardiogram, CT coronary arteries did not demonstrate indication for cardiac catheterization per cardiology consultant.      Recommendations:    #1.  Follow CBC/CMP    #2.  N.p.o. after midnight, may have medications, frequent upper endoscopy and endoscopic ultrasound on 7/17/2024 in the afternoon.

## 2024-07-16 NOTE — PROGRESS NOTE ADULT - ASSESSMENT
59 year old male with a PMHx of Renteria's esophagus (last EGD 2 years ago), diverticulitis s/p partial colectomy who presents with generalized body pain and 15-20 pound unintentional weight loss. He was found to have dilated intra and extra hepatic biliary ducts, dilated pancreatic duct without overt mass on CT. Blood work however otherwise negative for bilirubinemia. Pt is admitted for further work up

## 2024-07-16 NOTE — PROGRESS NOTE ADULT - PROBLEM SELECTOR PLAN 5
- DVT ppx; ambulate as tolerated  - GI ppx: protonix  - Diet: regular diet.
- DVT ppx; ambulate as tolerated  - GI ppx: protonix  - Diet: regular diet

## 2024-07-16 NOTE — PROGRESS NOTE ADULT - SUBJECTIVE AND OBJECTIVE BOX
Progress Note   Machelle Girich- PGY4- GI/Hep    SUBJECTIVE: Patient seen and examined at bedside.   - No abd pain; tolerating PO diet without N/V  - No acute complaints     OBJECTIVE:    VITAL SIGNS:  ICU Vital Signs Last 24 Hrs  T(C): 36.8 (16 Jul 2024 04:44), Max: 36.8 (16 Jul 2024 04:44)  T(F): 98.2 (16 Jul 2024 04:44), Max: 98.2 (16 Jul 2024 04:44)  HR: 63 (16 Jul 2024 13:06) (63 - 84)  BP: 125/77 (16 Jul 2024 13:06) (125/77 - 142/91)  BP(mean): --  ABP: --  ABP(mean): --  RR: 18 (16 Jul 2024 04:44) (18 - 18)  SpO2: 95% (16 Jul 2024 04:44) (95% - 95%)    O2 Parameters below as of 16 Jul 2024 04:44  Patient On (Oxygen Delivery Method): room air        PHYSICAL EXAM:    General: NAD  HEENT: NC/AT  Neck: supple  Respiratory: Regular RR, no increase in WOB  Cardiovascular: RRR  Abdomen: soft, NT/ND; +BS x4  Extremities: WWP, 2+ peripheral pulses b/l  Skin: normal color and turgor; no rash  Neurological: A&OX3    MEDICATIONS:  MEDICATIONS  (STANDING):  amLODIPine   Tablet 5 milliGRAM(s) Oral daily  pantoprazole    Tablet 40 milliGRAM(s) Oral before breakfast    MEDICATIONS  (PRN):  acetaminophen     Tablet .. 650 milliGRAM(s) Oral every 6 hours PRN Temp greater or equal to 38C (100.4F), Mild Pain (1 - 3)  aluminum hydroxide/magnesium hydroxide/simethicone Suspension 30 milliLiter(s) Oral every 4 hours PRN Dyspepsia  melatonin 3 milliGRAM(s) Oral at bedtime PRN Insomnia  ondansetron Injectable 4 milliGRAM(s) IV Push every 8 hours PRN Nausea and/or Vomiting  traMADol 25 milliGRAM(s) Oral every 4 hours PRN Severe Pain (7 - 10)      ALLERGIES:  Allergies    No Known Allergies    Intolerances        LABS:                        14.8   6.29  )-----------( 166      ( 15 Jul 2024 07:26 )             44.1     07-16    143  |  x   |  x   ----------------------------<  x   x    |  x   |  0.76    Ca    9.6      15 Jul 2024 07:26  Phos  2.7     07-15  Mg     1.7     07-15    TPro  x   /  Alb  x   /  TBili  0.3  /  DBili  x   /  AST  x   /  ALT  x   /  AlkPhos  x   07-16    PT/INR - ( 16 Jul 2024 07:10 )   PT: 10.8 sec;   INR: 1.03 ratio           Urinalysis Basic - ( 15 Jul 2024 07:26 )    Color: x / Appearance: x / SG: x / pH: x  Gluc: 122 mg/dL / Ketone: x  / Bili: x / Urobili: x   Blood: x / Protein: x / Nitrite: x   Leuk Esterase: x / RBC: x / WBC x   Sq Epi: x / Non Sq Epi: x / Bacteria: x      < from: MR MRCP w/wo IV Cont (07.15.24 @ 18:40) >    ACC: [id number] EXAM:  MR MRCP WAW IC   ORDERED BY:  CLIVE TURCIOS     PROCEDURE DATE:  07/15/2024          INTERPRETATION:  CLINICAL INFORMATION: Dilated pancreatic duct and   biliary ductal dilatation. Weight loss.    COMPARISON: CT 7/13/2024    CONTRAST/COMPLICATIONS:  IV Contrast: Gadavist  9.0cc cc administered   1.0cc cc discarded  Oral Contrast: NONE  Complications: None reported at time of study completion    PROCEDURE:  MRI of the abdomen was performed.  MRCP was performed.    FINDINGS:  LOWER CHEST: Within normal limits.    LIVER: Normal morphology. No steatosis. Subcentimeter right hepatic lobe   cyst.  BILE DUCTS: No intrahepatic biliary ductal dilatation. Mildly dilated CBD   to the level of the ampulla measuring up to 1 cm in greatest caliber.   Questionable filling defect of the distal CBD at the ampulla (3, 18 and   12, 6), which does not have the appearance of a stone. No abnormal   enhancement or diffusion restriction in this location.  GALLBLADDER: Fundal adenomyomatosis.  SPLEEN: Within normal limits.  PANCREAS: Slightly dilated pancreatic duct the level of the ampulla.  ADRENALS: Within normal limits.  KIDNEYS/URETERS: No hydronephrosis. Subcentimeter renal cysts.    VISUALIZED PORTIONS:  BOWEL: Colonic diverticulosis  PERITONEUM: No ascites.  VESSELS: Within normal limits.  RETROPERITONEUM/LYMPH NODES: No lymphadenopathy.  ABDOMINAL WALL: Edema of the paraspinal muscles of the lumbar spine noted.  BONES: Within normal limits.    IMPRESSION:  Mild CBD and pancreatic ductal dilatation to level of the ampulla.   Questionable filling defect of the distal CBD near the ampulla. While   suspicion for malignancy is low, endoscopic correlation is suggested to   rule out subtle ampullary/periampullary lesion.    < end of copied text >

## 2024-07-16 NOTE — DIETITIAN INITIAL EVALUATION ADULT - PERTINENT MEDS FT
MEDICATIONS  (STANDING):  amLODIPine   Tablet 5 milliGRAM(s) Oral daily  pantoprazole    Tablet 40 milliGRAM(s) Oral before breakfast    MEDICATIONS  (PRN):  acetaminophen     Tablet .. 650 milliGRAM(s) Oral every 6 hours PRN Temp greater or equal to 38C (100.4F), Mild Pain (1 - 3)  aluminum hydroxide/magnesium hydroxide/simethicone Suspension 30 milliLiter(s) Oral every 4 hours PRN Dyspepsia  melatonin 3 milliGRAM(s) Oral at bedtime PRN Insomnia  ondansetron Injectable 4 milliGRAM(s) IV Push every 8 hours PRN Nausea and/or Vomiting  traMADol 25 milliGRAM(s) Oral every 4 hours PRN Severe Pain (7 - 10)

## 2024-07-17 VITALS
OXYGEN SATURATION: 95 % | RESPIRATION RATE: 18 BRPM | HEART RATE: 95 BPM | SYSTOLIC BLOOD PRESSURE: 123 MMHG | DIASTOLIC BLOOD PRESSURE: 76 MMHG | TEMPERATURE: 98 F

## 2024-07-17 LAB
ALBUMIN SERPL ELPH-MCNC: 3.7 G/DL — SIGNIFICANT CHANGE UP (ref 3.3–5)
ALP SERPL-CCNC: 59 U/L — SIGNIFICANT CHANGE UP (ref 40–120)
ALT FLD-CCNC: 25 U/L — SIGNIFICANT CHANGE UP (ref 10–45)
ANION GAP SERPL CALC-SCNC: 14 MMOL/L — SIGNIFICANT CHANGE UP (ref 5–17)
AST SERPL-CCNC: 30 U/L — SIGNIFICANT CHANGE UP (ref 10–40)
BILIRUB SERPL-MCNC: 0.4 MG/DL — SIGNIFICANT CHANGE UP (ref 0.2–1.2)
BUN SERPL-MCNC: 16 MG/DL — SIGNIFICANT CHANGE UP (ref 7–23)
CALCIUM SERPL-MCNC: 9.9 MG/DL — SIGNIFICANT CHANGE UP (ref 8.4–10.5)
CHLORIDE SERPL-SCNC: 103 MMOL/L — SIGNIFICANT CHANGE UP (ref 96–108)
CO2 SERPL-SCNC: 22 MMOL/L — SIGNIFICANT CHANGE UP (ref 22–31)
CREAT SERPL-MCNC: 0.75 MG/DL — SIGNIFICANT CHANGE UP (ref 0.5–1.3)
EGFR: 105 ML/MIN/1.73M2 — SIGNIFICANT CHANGE UP
GLUCOSE SERPL-MCNC: 117 MG/DL — HIGH (ref 70–99)
HCT VFR BLD CALC: 45.2 % — SIGNIFICANT CHANGE UP (ref 39–50)
HGB BLD-MCNC: 15.2 G/DL — SIGNIFICANT CHANGE UP (ref 13–17)
MCHC RBC-ENTMCNC: 32.4 PG — SIGNIFICANT CHANGE UP (ref 27–34)
MCHC RBC-ENTMCNC: 33.6 GM/DL — SIGNIFICANT CHANGE UP (ref 32–36)
MCV RBC AUTO: 96.4 FL — SIGNIFICANT CHANGE UP (ref 80–100)
NRBC # BLD: 0 /100 WBCS — SIGNIFICANT CHANGE UP (ref 0–0)
PLATELET # BLD AUTO: 179 K/UL — SIGNIFICANT CHANGE UP (ref 150–400)
POTASSIUM SERPL-MCNC: 4.2 MMOL/L — SIGNIFICANT CHANGE UP (ref 3.5–5.3)
POTASSIUM SERPL-SCNC: 4.2 MMOL/L — SIGNIFICANT CHANGE UP (ref 3.5–5.3)
PROT SERPL-MCNC: 6.4 G/DL — SIGNIFICANT CHANGE UP (ref 6–8.3)
RBC # BLD: 4.69 M/UL — SIGNIFICANT CHANGE UP (ref 4.2–5.8)
RBC # FLD: 12.6 % — SIGNIFICANT CHANGE UP (ref 10.3–14.5)
SODIUM SERPL-SCNC: 139 MMOL/L — SIGNIFICANT CHANGE UP (ref 135–145)
WBC # BLD: 7.53 K/UL — SIGNIFICANT CHANGE UP (ref 3.8–10.5)
WBC # FLD AUTO: 7.53 K/UL — SIGNIFICANT CHANGE UP (ref 3.8–10.5)

## 2024-07-17 PROCEDURE — 83690 ASSAY OF LIPASE: CPT

## 2024-07-17 PROCEDURE — 81001 URINALYSIS AUTO W/SCOPE: CPT

## 2024-07-17 PROCEDURE — 83735 ASSAY OF MAGNESIUM: CPT

## 2024-07-17 PROCEDURE — 82550 ASSAY OF CK (CPK): CPT

## 2024-07-17 PROCEDURE — 86301 IMMUNOASSAY TUMOR CA 19-9: CPT

## 2024-07-17 PROCEDURE — 71260 CT THORAX DX C+: CPT | Mod: MC

## 2024-07-17 PROCEDURE — 86850 RBC ANTIBODY SCREEN: CPT

## 2024-07-17 PROCEDURE — 74183 MRI ABD W/O CNTR FLWD CNTR: CPT | Mod: MC

## 2024-07-17 PROCEDURE — 86304 IMMUNOASSAY TUMOR CA 125: CPT

## 2024-07-17 PROCEDURE — 88305 TISSUE EXAM BY PATHOLOGIST: CPT

## 2024-07-17 PROCEDURE — 99285 EMERGENCY DEPT VISIT HI MDM: CPT

## 2024-07-17 PROCEDURE — 76705 ECHO EXAM OF ABDOMEN: CPT

## 2024-07-17 PROCEDURE — 84100 ASSAY OF PHOSPHORUS: CPT

## 2024-07-17 PROCEDURE — 74177 CT ABD & PELVIS W/CONTRAST: CPT | Mod: MC

## 2024-07-17 PROCEDURE — 93356 MYOCRD STRAIN IMG SPCKL TRCK: CPT

## 2024-07-17 PROCEDURE — 96374 THER/PROPH/DIAG INJ IV PUSH: CPT

## 2024-07-17 PROCEDURE — 85610 PROTHROMBIN TIME: CPT

## 2024-07-17 PROCEDURE — 87086 URINE CULTURE/COLONY COUNT: CPT

## 2024-07-17 PROCEDURE — C8929: CPT

## 2024-07-17 PROCEDURE — 88312 SPECIAL STAINS GROUP 1: CPT

## 2024-07-17 PROCEDURE — 84484 ASSAY OF TROPONIN QUANT: CPT

## 2024-07-17 PROCEDURE — 82565 ASSAY OF CREATININE: CPT

## 2024-07-17 PROCEDURE — 84295 ASSAY OF SERUM SODIUM: CPT

## 2024-07-17 PROCEDURE — 36415 COLL VENOUS BLD VENIPUNCTURE: CPT

## 2024-07-17 PROCEDURE — 88305 TISSUE EXAM BY PATHOLOGIST: CPT | Mod: 26

## 2024-07-17 PROCEDURE — 82962 GLUCOSE BLOOD TEST: CPT

## 2024-07-17 PROCEDURE — 85027 COMPLETE CBC AUTOMATED: CPT

## 2024-07-17 PROCEDURE — 88312 SPECIAL STAINS GROUP 1: CPT | Mod: 26

## 2024-07-17 PROCEDURE — 96375 TX/PRO/DX INJ NEW DRUG ADDON: CPT

## 2024-07-17 PROCEDURE — 86900 BLOOD TYPING SEROLOGIC ABO: CPT

## 2024-07-17 PROCEDURE — 82247 BILIRUBIN TOTAL: CPT

## 2024-07-17 PROCEDURE — 43239 EGD BIOPSY SINGLE/MULTIPLE: CPT | Mod: GC

## 2024-07-17 PROCEDURE — 80053 COMPREHEN METABOLIC PANEL: CPT

## 2024-07-17 PROCEDURE — 75574 CT ANGIO HRT W/3D IMAGE: CPT | Mod: MC

## 2024-07-17 PROCEDURE — 43259 EGD US EXAM DUODENUM/JEJUNUM: CPT | Mod: GC

## 2024-07-17 PROCEDURE — 82378 CARCINOEMBRYONIC ANTIGEN: CPT

## 2024-07-17 PROCEDURE — 86901 BLOOD TYPING SEROLOGIC RH(D): CPT

## 2024-07-17 PROCEDURE — 86703 HIV-1/HIV-2 1 RESULT ANTBDY: CPT

## 2024-07-17 PROCEDURE — A9585: CPT

## 2024-07-17 PROCEDURE — 84443 ASSAY THYROID STIM HORMONE: CPT

## 2024-07-17 RX ORDER — LIDOCAINE HYDROCHLORIDE 20 MG/ML
4 INJECTION, SOLUTION EPIDURAL; INFILTRATION; INTRACAUDAL; PERINEURAL ONCE
Refills: 0 | Status: DISCONTINUED | OUTPATIENT
Start: 2024-07-17 | End: 2024-07-17

## 2024-07-17 RX ORDER — AMLODIPINE BESYLATE 2.5 MG/1
1 TABLET ORAL
Qty: 30 | Refills: 0
Start: 2024-07-17 | End: 2024-08-15

## 2024-07-17 RX ORDER — PANTOPRAZOLE SODIUM 40 MG/10ML
1 INJECTION, POWDER, FOR SOLUTION INTRAVENOUS
Qty: 30 | Refills: 0
Start: 2024-07-17 | End: 2024-08-15

## 2024-07-17 RX ORDER — SODIUM CHLORIDE 0.9 % (FLUSH) 0.9 %
500 SYRINGE (ML) INJECTION
Refills: 0 | Status: DISCONTINUED | OUTPATIENT
Start: 2024-07-17 | End: 2024-07-17

## 2024-07-17 RX ORDER — ACETAMINOPHEN 325 MG
1000 TABLET ORAL ONCE
Refills: 0 | Status: COMPLETED | OUTPATIENT
Start: 2024-07-17 | End: 2024-07-17

## 2024-07-17 RX ADMIN — Medication 400 MILLIGRAM(S): at 11:13

## 2024-07-17 RX ADMIN — AMLODIPINE BESYLATE 5 MILLIGRAM(S): 2.5 TABLET ORAL at 04:57

## 2024-07-17 RX ADMIN — Medication 1 TABLET(S): at 17:45

## 2024-07-17 RX ADMIN — Medication 650 MILLIGRAM(S): at 04:36

## 2024-07-17 RX ADMIN — PANTOPRAZOLE SODIUM 40 MILLIGRAM(S): 40 INJECTION, POWDER, FOR SOLUTION INTRAVENOUS at 04:58

## 2024-07-17 NOTE — DISCHARGE NOTE PROVIDER - NSFOLLOWUPCLINICS_GEN_ALL_ED_FT
[facility] General Internal Medicine  General Internal Medicine  [address] [id number]  Phone: [phone]  Fax:   Follow Up Time: 2 weeks

## 2024-07-17 NOTE — DISCHARGE NOTE PROVIDER - NSDCMRMEDTOKEN_GEN_ALL_CORE_FT
Dexilant 60 mg oral delayed release capsule: 1 cap(s) orally once a day NOTE: As per Pharmacy, last filled 2022  ibuprofen 800 mg oral tablet: 1 tab(s) orally 3 times a day as needed   amLODIPine 5 mg oral tablet: 1 tab(s) orally once a day  Dexilant 60 mg oral delayed release capsule: 1 cap(s) orally once a day NOTE: As per Pharmacy, last filled 2022  ibuprofen 800 mg oral tablet: 1 tab(s) orally 3 times a day as needed   amLODIPine 5 mg oral tablet: 1 tab(s) orally once a day  Dexilant 60 mg oral delayed release capsule: 1 cap(s) orally once a day NOTE: As per Pharmacy, last filled 2022  ibuprofen 800 mg oral tablet: 1 tab(s) orally 3 times a day as needed  pantoprazole 40 mg oral delayed release tablet: 1 tab(s) orally once a day MDD: 1

## 2024-07-17 NOTE — DISCHARGE NOTE PROVIDER - NSDCCAREPROVSEEN_GEN_ALL_CORE_FT
Ama, Giovani Carpenter, Machelle Rivero, Eloisa Rangel, Main Gates, Kareem Giordano, Ara Wang, Jose Alberto Chatterjee, Elly Tran, Siva Rothman

## 2024-07-17 NOTE — PRE PROCEDURE NOTE - PRE PROCEDURE EVALUATION
Attending Physician: Felipe                   Procedure: EGD & EUS    Indication for Procedure: dilated CBD and PD  ________________________________________________________  PAST MEDICAL & SURGICAL HISTORY:  Renteria esophagus      Carpal tunnel syndrome      Hypertension      Diverticulitis      Sore throat  evaluated 1/20/19 [facility] , strep negative , pt treated with augmentin start 1/20/19 for 7 day treatment      S/P colon resection  > 10 years ago      H/O carpal tunnel repair  right -2018        ALLERGIES:  No Known Allergies    HOME MEDICATIONS:  Dexilant 60 mg oral delayed release capsule: 1 cap(s) orally once a day NOTE: As per Pharmacy, last filled 2022  ibuprofen 800 mg oral tablet: 1 tab(s) orally 3 times a day as needed    AICD/PPM: [ ] yes   [ x] no    PERTINENT LAB DATA:                        15.2   7.53  )-----------( 179      ( 17 Jul 2024 07:06 )             45.2     07-17    139  |  103  |  16  ----------------------------<  117<H>  4.2   |  22  |  0.75    Ca    9.9      17 Jul 2024 07:18    TPro  6.4  /  Alb  3.7  /  TBili  0.4  /  DBili  x   /  AST  30  /  ALT  25  /  AlkPhos  59  07-17    PT/INR - ( 16 Jul 2024 07:10 )   PT: 10.8 sec;   INR: 1.03 ratio                     PHYSICAL EXAMINATION:    T(C): 36.3  HR: 65  BP: 136/80  RR: 16  SpO2: 99%    Constitutional: NAD  Neck:  supple  Respiratory: no respiratory stress, no audible wheezes  Cardiovascular: RR  Gastrointestinal: soft, NT/ND  Extremities: No peripheral edema  Neurological: Alert, no focal deficits  Psychiatric: Normal mood, normal affect  Skin: No rashes      COMMENTS:    The patient is a suitable candidate for the planned procedure unless box checked [ ]  No, explain:     Spoke with patient please see prior note.

## 2024-07-17 NOTE — PROGRESS NOTE ADULT - NSPROGADDITIONALINFOA_GEN_ALL_CORE
Thirty five minutes spent on encounter, of which more than fifty percent of the encounter was spent on counseling and/or coordinating care by the attending physician.
D/w KELSY Dawson

## 2024-07-17 NOTE — DISCHARGE NOTE PROVIDER - CARE PROVIDERS DIRECT ADDRESSES
,DirectAddress_Unknown ,DirectAddress_Unknown,[email or web address]

## 2024-07-17 NOTE — DISCHARGE NOTE PROVIDER - ATTENDING DISCHARGE PHYSICAL EXAMINATION:
Vital Signs Last 24 Hrs  T(C): 36.8 (17 Jul 2024 12:16), Max: 36.8 (17 Jul 2024 12:16)  T(F): 98.2 (17 Jul 2024 12:16), Max: 98.2 (17 Jul 2024 12:16)  HR: 70 (17 Jul 2024 12:16) (70 - 81)  BP: 135/81 (17 Jul 2024 12:16) (135/81 - 143/88)  BP(mean): --  RR: 18 (17 Jul 2024 12:16) (18 - 18)  SpO2: 99% (17 Jul 2024 12:16) (95% - 99%)    Parameters below as of 17 Jul 2024 04:40  Patient On (Oxygen Delivery Method): room air        CONSTITUTIONAL: NAD, well-developed, well-groomed  EYES: PERRLA; conjunctiva and sclera clear  ENMT: Moist oral mucosa, no pharyngeal injection or exudates; normal dentition  NECK: Supple, no palpable masses; no thyromegaly  RESPIRATORY: Normal respiratory effort; lungs are clear to auscultation bilaterally  CARDIOVASCULAR: Regular rate and rhythm, normal S1 and S2, no murmur/rub/gallop; No lower extremity edema; Peripheral pulses are 2+ bilaterally  ABDOMEN: Nontender to palpation, normoactive bowel sounds, no rebound/guarding; No hepatosplenomegaly  MUSCULOSKELETAL:  Normal gait; no clubbing or cyanosis of digits; no joint swelling or tenderness to palpation  PSYCH: A+O to person, place, and time; affect appropriate  NEUROLOGY: CN 2-12 are intact and symmetric; no gross sensory deficits   SKIN: No rashes; no palpable lesions

## 2024-07-17 NOTE — PROGRESS NOTE ADULT - SUBJECTIVE AND OBJECTIVE BOX
DATE OF SERVICE: 07-17-24 @ 21:39    Patient is a 59y old  Male who presents with a chief complaint of malaise, dilated intra and extra hepatic biliary duct, dilated pancreatic duct (17 Jul 2024 12:39)      INTERVAL HISTORY: feels ok    REVIEW OF SYSTEMS:  CONSTITUTIONAL: No weakness  EYES/ENT: No visual changes;  No throat pain   NECK: No pain or stiffness  RESPIRATORY: No cough, wheezing; No shortness of breath  CARDIOVASCULAR: No chest pain or palpitations  GASTROINTESTINAL: No abdominal  pain. No nausea, vomiting, or hematemesis  GENITOURINARY: No dysuria, frequency or hematuria  NEUROLOGICAL: No stroke like symptoms  SKIN: No rashes    	  MEDICATIONS:  amLODIPine   Tablet 5 milliGRAM(s) Oral daily        PHYSICAL EXAM:  T(C): 36.7 (07-17-24 @ 18:02), Max: 36.8 (07-17-24 @ 12:16)  HR: 95 (07-17-24 @ 18:02) (65 - 95)  BP: 123/76 (07-17-24 @ 18:02) (96/60 - 143/88)  RR: 18 (07-17-24 @ 18:02) (12 - 21)  SpO2: 95% (07-17-24 @ 18:02) (95% - 99%)  Wt(kg): --  I&O's Summary      Weight (kg): 90.7 (07-17 @ 15:44)    Appearance: In no distress	  HEENT:    PERRL, EOMI	  Cardiovascular:  S1 S2, No JVD  Respiratory: Lungs clear to auscultation	  Gastrointestinal:  Soft, Non-tender, + BS	  Vascularature:  No edema of LE  Psychiatric: Appropriate affect   Neuro: no acute focal deficits                               15.2   7.53  )-----------( 179      ( 17 Jul 2024 07:06 )             45.2     07-17    139  |  103  |  16  ----------------------------<  117<H>  4.2   |  22  |  0.75    Ca    9.9      17 Jul 2024 07:18    TPro  6.4  /  Alb  3.7  /  TBili  0.4  /  DBili  x   /  AST  30  /  ALT  25  /  AlkPhos  59  07-17        Labs personally reviewed    TTE CONCLUSIONS:      1. Left ventricular cavity is normal in size. Left ventricular wall thickness is normal. Left ventricular systolic function is normal with anejection fraction of 42 % by Craig's method of disks. Regional wall motion abnormalities present.   2. Multiple segmental abnormalities exist. See findings.   3. There is mild (grade 1) left ventricular diastolic dysfunction, with normal left ventricular filling pressure.   4. Normal right ventricular cavity size, with normal wall thickness, and normal right ventricular systolic function.   5. Normal left and right atrial size.   6. No pericardial effusion seen.   7. Left ventricular global longitudinal strain is -14.2 % is abnormal (> -16%). Images were acquired on a [employer] ultrasound system and processed using [employer] strain analysis software with a heart rate of 87 bpm and a blood pressure of 142/91 mmHg.   8. There is no evidence of a left ventricular thrombus.        Assessment and Plan:   59 year old male with a PMHx of Renteria's esophagus (last EGD 2 years ago), diverticulitis s/p partial colectomy who presents with generalized body pain and 15-20 pound unintentional weight loss. He was found to have dilated intra and extra hepatic biliary ducts, dilated pancreatic duct without overt mass on CT. Blood work however otherwise negative for bilirubinemia. Pt is admitted for further work up.      Problem/Plan - 1:  ·  Problem: Abnormal Echo  ·  Plan: LVEF of 42 %. Regional wall motion abnormalities present.  - CTA heart to define coronary anatomy, with mild disease only  - likely hypertensive heart disease 2/2 long standing uncontrolled HTN    Problem/Plan - 2:  ·  Problem: HTN  ·  Plan: Started Amlodipine 5mg daily    Problem/Plan - 3:  ·  Problem: Elevated troponin.   ·  Plan: Without associated symptoms of ACS  - s/p CTA heart as noted above    Problem/Plan - 4:  ·  Problem: Preop Risk Stratification  ·  Plan: Mod risk for low- mod risk GI endoscopic procedure, no contraindication to proceed  - tolerated procedure well          Kareem Gates DO [facility]  Cardiovascular Medicine  [address] [facility], [address]  Office: [phone]  Available via Text/call on [employer] Teams

## 2024-07-17 NOTE — DISCHARGE NOTE PROVIDER - HOSPITAL COURSE
HPI:  59 year old male with a PMHx of Renteria's esophagus (last EGD 2 years ago), diverticulitis s/p partial colectomy who presents with generalized body pain, malaise. Pt was in his typical state of health until about 3-4 weeks ago when he had loss of appetite. He developed diffuse body aches, weight loss of 15-20 pounds. Pt is a  and typically very active; however, he found it difficult to even stand up straight.  (14 Jul 2024 11:49)    Hospital Course:  Patient evaluated  at [facility] for generalized body pain and malaise. Patient states 3-4 weeks ago he had loss of appetite. He states that he has had diffuse body aches and weight loss of 15-20 pounds. CT was performed in the emergency room showing dilated intra/extrahepatic ducts, dilated pancreatic duct. Patient was admitted for further workup. RUQ US abdomen showed intra/extrahepatic ducts as well. GI service was consulted who recommended performing MRCP.     MRCP--> Mild CBD and pancreatic ductal dilatation to level of the ampulla. Questionable filling defect of the distal CBD near the ampulla. While  suspicion for malignancy is low, endoscopic correlation is suggested to rule out subtle ampullary/periampullary lesion.    Based on these results--> patient was seen to have         Important Medication Changes and Reason:    Active or Pending Issues Requiring Follow-up:    Advanced Directives:   [ ] Full code  [ ] DNR  [ ] Hospice    Discharge Diagnoses:         HPI:  59 year old male with a PMHx of Renteria's esophagus (last EGD 2 years ago), diverticulitis s/p partial colectomy who presents with generalized body pain, malaise. Pt was in his typical state of health until about 3-4 weeks ago when he had loss of appetite. He developed diffuse body aches, weight loss of 15-20 pounds. Pt is a  and typically very active; however, he found it difficult to even stand up straight.  (14 Jul 2024 11:49)    Hospital Course:  Patient evaluated  at [facility] for generalized body pain and malaise. Patient states 3-4 weeks ago he had loss of appetite. He states that he has had diffuse body aches and weight loss of 15-20 pounds. CT was performed in the emergency room showing dilated intra/extrahepatic ducts, dilated pancreatic duct. Patient was admitted for further workup. RUQ US abdomen showed intra/extrahepatic ducts as well. GI service was consulted who recommended performing MRCP.     MRCP--> Mild CBD and pancreatic ductal dilatation to level of the ampulla. Questionable filling defect of the distal CBD near the ampulla. While  suspicion for malignancy is low, endoscopic correlation is suggested to rule out subtle ampullary/periampullary lesion.    Based on these results--> patient planned for EUS by GI which showed...        Important Medication Changes and Reason:    Active or Pending Issues Requiring Follow-up:  Followup Cardiology outpatient   Followup GI     Advanced Directives:   [ x] Full code  [ ] DNR  [ ] Hospice    Discharge Diagnoses:  Fatigue  CBD dilatation  Renteria's Esophagus  Need for prophylactic measure           HPI:  59 year old male with a PMHx of Renteria's esophagus (last EGD 2 years ago), diverticulitis s/p partial colectomy who presents with generalized body pain, malaise. Pt was in his typical state of health until about 3-4 weeks ago when he had loss of appetite. He developed diffuse body aches, weight loss of 15-20 pounds. Pt is a  and typically very active; however, he found it difficult to even stand up straight.  (14 Jul 2024 11:49)    Hospital Course:  Patient evaluated  at [facility] for generalized body pain and malaise. Patient states 3-4 weeks ago he had loss of appetite. He states that he has had diffuse body aches and weight loss of 15-20 pounds. CT was performed in the emergency room showing dilated intra/extrahepatic ducts, dilated pancreatic duct. Patient was admitted for further workup. RUQ US abdomen showed intra/extrahepatic ducts as well. GI service was consulted who recommended performing MRCP.     Patient had TTE performed which showed  Left ventricular cavity is normal in size. Left ventricular wall thickness is normal. Left ventricular systolic function is normal with an ejection fraction of 42 % by Craig's method of disks. Regional wall motion abnormalities present.   2. Multiple segmental abnormalities exist. See findings.   3. There is mild (grade 1) left ventricular diastolic dysfunction, with normal left ventricular filling pressure.   4. Normal right ventricular cavity size, with normal wall thickness, and normal right ventricular systolic function.   5. Normal left and right atrial size.   6. No pericardial effusion seen.   7. There is no evidence of a left ventricular thrombus.    Cardiology consulted due to TTE findings and recommended CTCA which showed mild-moderate disease. No intervention planned. Outpatient cardiology followup.       MRCP--> Mild CBD and pancreatic ductal dilatation to level of the ampulla. Questionable filling defect of the distal CBD near the ampulla. While  suspicion for malignancy is low, endoscopic correlation is suggested to rule out subtle ampullary/periampullary lesion.    Based on these results--> patient planned for EUS by GI which showed...        Important Medication Changes and Reason:    Active or Pending Issues Requiring Follow-up:  Followup Cardiology outpatient   Followup GI     Advanced Directives:   [ x] Full code  [ ] DNR  [ ] Hospice    Discharge Diagnoses:  Fatigue  CBD dilatation  Renteria's Esophagus  Need for prophylactic measure           HPI:  59 year old male with a PMHx of Renteria's esophagus (last EGD 2 years ago), diverticulitis s/p partial colectomy who presents with generalized body pain, malaise. Pt was in his typical state of health until about 3-4 weeks ago when he had loss of appetite. He developed diffuse body aches, weight loss of 15-20 pounds. Pt is a  and typically very active; however, he found it difficult to even stand up straight.  (14 Jul 2024 11:49)    Hospital Course:  Patient evaluated  at [facility] for generalized body pain and malaise. Patient states 3-4 weeks ago he had loss of appetite. He states that he has had diffuse body aches and weight loss of 15-20 pounds. CT was performed in the emergency room showing dilated intra/extrahepatic ducts, dilated pancreatic duct. Patient was admitted for further workup. RUQ US abdomen showed intra/extrahepatic ducts as well. GI service was consulted who recommende  d performing MRCP.     Patient had TTE performed which showed  Left ventricular cavity is normal in size. Left ventricular wall thickness is normal. Left ventricular systolic function is normal with an ejection fraction of 42 % by Craig's method of disks. Regional wall motion abnormalities present.   2. Multiple segmental abnormalities exist. See findings.   3. There is mild (grade 1) left ventricular diastolic dysfunction, with normal left ventricular filling pressure.   4. Normal right ventricular cavity size, with normal wall thickness, and normal right ventricular systolic function.   5. Normal left and right atrial size.   6. No pericardial effusion seen.   7. There is no evidence of a left ventricular thrombus.    Cardiology consulted due to TTE findings and recommended CTCA which showed mild-moderate disease. No intervention planned. Outpatient cardiology followup.       MRCP--> Mild CBD and pancreatic ductal dilatation to level of the ampulla. Questionable filling defect of the distal CBD near the ampulla. While  suspicion for malignancy is low, endoscopic correlation is suggested to rule out subtle ampullary/periampullary lesion.    Based on these results--> patient planned for EUS by GI which showed...    Important Medication Changes and Reason:    Active or Pending Issues Requiring Follow-up:  Followup Cardiology outpatient   Followup GI     Advanced Directives:   [ x] Full code  [ ] DNR  [ ] Hospice    Discharge Diagnoses:  Fatigue  CBD dilatation  Renteria's Esophagus  Need for prophylactic measure.            HPI:  59 year old male with a PMHx of Renteria's esophagus (last EGD 2 years ago), diverticulitis s/p partial colectomy who presents with generalized body pain, malaise. Pt was in his typical state of health until about 3-4 weeks ago when he had loss of appetite. He developed diffuse body aches, weight loss of 15-20 pounds. Pt is a  and typically very active; however, he found it difficult to even stand up straight.  (14 Jul 2024 11:49)    Hospital Course:  Patient evaluated  at [facility] for generalized body pain and malaise. Patient states 3-4 weeks ago he had loss of appetite. He states that he has had diffuse body aches and weight loss of 15-20 pounds. CT was performed in the emergency room showing dilated intra/extrahepatic ducts, dilated pancreatic duct. Patient was admitted for further workup. RUQ US abdomen showed intra/extrahepatic ducts as well. GI service was consulted who recommende  d performing MRCP.     Patient had TTE performed which showed  Left ventricular cavity is normal in size. Left ventricular wall thickness is normal. Left ventricular systolic function is normal with an ejection fraction of 42 % by Craig's method of disks. Regional wall motion abnormalities present.   2. Multiple segmental abnormalities exist. See findings.   3. There is mild (grade 1) left ventricular diastolic dysfunction, with normal left ventricular filling pressure.   4. Normal right ventricular cavity size, with normal wall thickness, and normal right ventricular systolic function.   5. Normal left and right atrial size.   6. No pericardial effusion seen.   7. There is no evidence of a left ventricular thrombus.    Cardiology consulted due to TTE findings and recommended CTCA which showed mild-moderate disease. No intervention planned. Outpatient cardiology followup.       MRCP--> Mild CBD and pancreatic ductal dilatation to level of the ampulla. Questionable filling defect of the distal CBD near the ampulla. While  suspicion for malignancy is low, endoscopic correlation is suggested to rule out subtle ampullary/periampullary lesion.    Based on these results--> patient planned for EUS by GI which showed...    		EGD:                       - Esophageal mucosal changes suspicious for short-segment Renteria's esophagus                        (C1M2). Biopsied.                       - The major papilla was notable for a small amount of erythematous edematous                        tissue at the orifice.                       - Medium sized diverticulum in the 3rd portion of the duodenum                   EUS:                       - There was dilation in the common bile duct which measured up to 9.2 mm.                       - There was dilation in the pancreatic duct which measured up to 5.3 mm.                       - The ampulla was notable for a 7-8 mm area of heterogeneous tissue.                       The major papilla/ampullary findings could represent inflammatory tissue from                        recently passed sludge/stone, papillary stenosis/scar tissue, adenomatous                        tissue. No discrete hypoechoic mass.    Important Medication Changes and Reason:   Protonix or Omeprazole 40 mg daily.     Active or Pending Issues Requiring Follow-up:  Followup Cardiology outpatient   Followup GI     Advanced Directives:   [ x] Full code  [ ] DNR  [ ] Hospice    Discharge Diagnoses:  Fatigue  CBD dilatation  Renteria's Esophagus  Need for prophylactic measure.

## 2024-07-17 NOTE — PACU DISCHARGE NOTE - AIRWAY PATENCY:
Referring Provider:    Self, Aaareferral  No address on file  Subjective:   Patient: Yoni Hilario [id number], :2019   Visit date:10/2/2023 2:32 PM    Chief Complaint:  Follow-up    HPI:    Prior notes reviewed by myself.  Clinical documentation obtained by nursing staff reviewed.     4-year-old gentleman presents for re-evaluation after ear tubes.  He had tubes placed in 2023.  Since that time he has done well, but he did have 1 episode of otorrhea treated with ear drops in .  He is here with his nanny but she does not report any major issues or concerns.      Objective:     Physical Exam:  Vitals:  There were no vitals taken for this visit.  General appearance:  Well developed, well nourished    Ears:  Otoscopy of external auditory canals and tympanic membranes was significant for patent PETs bilaterally, clinical speech reception thresholds grossly intact, no mass/lesion of auricle.    Nose:  No masses/lesions of external nose, nasal mucosa, septum, and turbinates were within normal limits.    Mouth:  No mass/lesion of lips, teeth, gums, hard/soft palate, tongue, tonsils, or oropharynx.    Neck & Lymphatics:  No cervical lymphadenopathy, no neck mass/crepitus/ asymmetry, trachea is midline, no thyroid enlargement/tenderness/mass.        [x]  Data Reviewed:    Lab Results   Component Value Date    WBC 8.41 2023    HGB 12.8 2023    HCT 37.0 2023    MCV 75 2023    EOSINOPHIL 2.6 2023             Assessment & Plan:   Bilateral patent pressure equalization (PE) tubes    History of placement of ear tubes        Tubes are patent and dry.  Recommend follow-up in 6 months for recheck.     Satisfactory

## 2024-07-17 NOTE — DISCHARGE NOTE PROVIDER - PROVIDER TOKENS
PROVIDER:[TOKEN:[[id number]],FOLLOWUP:[2 weeks]] PROVIDER:[TOKEN:[[id number]],FOLLOWUP:[2 weeks]],PROVIDER:[TOKEN:[[id number]],FOLLOWUP:[1 month],ESTABLISHEDPATIENT:[T]]

## 2024-07-17 NOTE — DISCHARGE NOTE PROVIDER - DETAILS OF MALNUTRITION DIAGNOSIS/DIAGNOSES
This patient has been assessed with a concern for Malnutrition and was treated during this hospitalization for the following Nutrition diagnosis/diagnoses:     -  07/16/2024: Moderate protein-calorie malnutrition

## 2024-07-17 NOTE — DISCHARGE NOTE NURSING/CASE MANAGEMENT/SOCIAL WORK - PATIENT PORTAL LINK FT
You can access the FollowMyHealth Patient Portal offered by [facility] by registering at the following website: [email or web address]. By joining [facility]’s FollowMyHealth portal, you will also be able to view your health information using other applications (apps) compatible with our system.

## 2024-07-17 NOTE — DISCHARGE NOTE PROVIDER - NSDCCPCAREPLAN_GEN_ALL_CORE_FT
PRINCIPAL DISCHARGE DIAGNOSIS  Diagnosis: Dilated pancreatic duct  Assessment and Plan of Treatment: You had a dilated pancreatic duct requiring imaging as well as an Endoscopic Ultrasound. Please followup outpatient regularly.      SECONDARY DISCHARGE DIAGNOSES  Diagnosis: Dilated intrahepatic bile duct  Assessment and Plan of Treatment:      PRINCIPAL DISCHARGE DIAGNOSIS  Diagnosis: Dilated pancreatic duct  Assessment and Plan of Treatment: You had a dilated pancreatic duct requiring imaging as well as an Endoscopic Ultrasound. Please followup outpatient regularly.      SECONDARY DISCHARGE DIAGNOSES  Diagnosis: Dilated intrahepatic bile duct  Assessment and Plan of Treatment: Protonix 40 mg daily.   Follow up with Dr. Jose Alberto Wang in 2 months. (GI).

## 2024-07-17 NOTE — PRE PROCEDURE NOTE - PRIOR H&P DATE
Abiliojazlyn Padgett is a 76 year old male presenting with EMG   Referred by Dr. Willi Osei     
Neurophysiologic testing performed today demonstrates the presence of left carpal tunnel syndrome.  He has had prior carpal tunnel release, and is not reporting pain, numbness, or weakness in the left as would be expected from symptomatic carpal tunnel syndrome.  He also is not reporting more proximal numbness, paresthesias or pain in the left upper extremity.  He has had some improvement in the restricted range of motion when reaching his left arm across to right side of the body.  Chronic left C5 radiculopathy is also demonstrated, but this is actually an improvement neurophysiologic testing from May 2013.  We have agreed that he may continue to defer surgical intervention of the cervical spine for now.  He will follow up in 6 months for reassessment, including of his diabetic polyneuropathy.  
17-Jul-2024

## 2024-07-17 NOTE — DISCHARGE NOTE PROVIDER - CARE PROVIDER_API CALL
Kareem Gates  Cardiovascular Disease  [address], [address]  [place] [address]  Phone: [phone]  Fax: [phone]  Follow Up Time: 2 weeks   Kareem Gates  Cardiovascular Disease  [address], [address]  [place] [address]  Phone: [phone]  Fax: [phone]  Follow Up Time: 2 weeks    Jose Alberto Wang  Gastroenterology  [address], Division of Gastroenterology - [address] [place] [address]  Phone: [phone]  Fax: [phone]  Established Patient  Follow Up Time: 1 month

## 2024-07-17 NOTE — PROGRESS NOTE ADULT - REASON FOR ADMISSION
malaise, dilated intra and extra hepatic biliary duct, dilated pancreatic duct

## 2024-07-24 PROBLEM — Z00.00 ENCOUNTER FOR PREVENTIVE HEALTH EXAMINATION: Status: ACTIVE | Noted: 2024-07-24

## 2024-08-09 ENCOUNTER — TRANSCRIPTION ENCOUNTER (OUTPATIENT)
Age: 59
End: 2024-08-09

## 2024-08-09 ENCOUNTER — APPOINTMENT (OUTPATIENT)
Dept: UROLOGY | Facility: CLINIC | Age: 59
End: 2024-08-09

## 2024-08-09 PROBLEM — Z80.42 FAMILY HISTORY OF MALIGNANT NEOPLASM OF PROSTATE: Status: ACTIVE | Noted: 2024-08-09

## 2024-08-09 PROBLEM — Z80.42 FAMILY HISTORY OF PROSTATE CANCER: Status: ACTIVE | Noted: 2024-08-09

## 2024-08-09 PROBLEM — Z12.5 PROSTATE CANCER SCREENING: Status: ACTIVE | Noted: 2024-08-09

## 2024-08-09 PROBLEM — R35.1 NOCTURIA: Status: ACTIVE | Noted: 2024-08-09

## 2024-08-09 PROBLEM — Z78.9 SOCIAL ALCOHOL USE: Status: ACTIVE | Noted: 2024-08-09

## 2024-08-09 PROBLEM — Z84.1 FAMILY HISTORY OF KIDNEY STONES: Status: ACTIVE | Noted: 2024-08-09

## 2024-08-09 PROCEDURE — 51798 US URINE CAPACITY MEASURE: CPT

## 2024-08-09 PROCEDURE — 81003 URINALYSIS AUTO W/O SCOPE: CPT | Mod: QW

## 2024-08-09 PROCEDURE — 99203 OFFICE O/P NEW LOW 30 MIN: CPT | Mod: 25

## 2024-08-09 PROCEDURE — G2211 COMPLEX E/M VISIT ADD ON: CPT | Mod: NC

## 2024-08-09 NOTE — PHYSICAL EXAM
[General Appearance - Well Developed] : well developed [General Appearance - Well Nourished] : well nourished [Abdomen Tenderness] : non-tender [Abdomen Soft] : soft [de-identified] : PVR: 85

## 2024-08-09 NOTE — ASSESSMENT
[FreeTextEntry1] :  Mr. Mccoy presents for initial evaluation of nocturia No other LUTS, severe bother Denies snoring or TONI history UA: protein; PVR: 85 cc  Recommendations -3 day voiding diary - supplies and instructions provided -PSA -UCx -RTC 1 week

## 2024-08-09 NOTE — HISTORY OF PRESENT ILLNESS
[FreeTextEntry1] : 59M presents for initial evaluation of nocturia   PMH significant for: barretts esophagus  PSH significant for: nothing Significant meds: nothing   Patient reports weeks-long history of nocturia Reports severe level of distress  Associated with nothing Previously treated with nothing   Daytime Frequency: 2-3 Nighttime Frequency: 7-8  Pads per day: 0 Straining to void: no Intermittency: no Dribbling: no Subjective Incomplete Emptying: no UTIs this past year: 0 PVR 85cc  Daily Fluid Total: 10-15 bottles of water -- works outside in construction  Daily Caffeine Total: 2 cups   Neurologic Hx, Vision or Balance changes: no

## 2024-08-12 ENCOUNTER — NON-APPOINTMENT (OUTPATIENT)
Age: 59
End: 2024-08-12

## 2024-09-03 ENCOUNTER — NON-APPOINTMENT (OUTPATIENT)
Age: 59
End: 2024-09-03

## 2024-09-05 ENCOUNTER — APPOINTMENT (OUTPATIENT)
Dept: UROLOGY | Facility: CLINIC | Age: 59
End: 2024-09-05

## 2024-09-13 NOTE — PATIENT PROFILE ADULT - HAVE YOU EXPERIENCED VIOLENCE OR A TRAUMATIC EVENT?
no Griseofulvin Pregnancy And Lactation Text: This medication is Pregnancy Category X and is known to cause serious birth defects. It is unknown if this medication is excreted in breast milk but breast feeding should be avoided. Low Dose Naltrexone Pregnancy And Lactation Text: Naltrexone is pregnancy category C.  There have been no adequate and well-controlled studies in pregnant women.  It should be used in pregnancy only if the potential benefit justifies the potential risk to the fetus.   Limited data indicates that naltrexone is minimally excreted into breastmilk. Valtrex Counseling: I discussed with the patient the risks of valacyclovir including but not limited to kidney damage, nausea, vomiting and severe allergy.  The patient understands that if the infection seems to be worsening or is not improving, they are to call. Eucrisa Counseling: Patient may experience a mild burning sensation during topical application. Eucrisa is not approved in children less than 3 months of age. Doxycycline Pregnancy And Lactation Text: This medication is Pregnancy Category D and not consider safe during pregnancy. It is also excreted in breast milk but is considered safe for shorter treatment courses. Zyclara Pregnancy And Lactation Text: This medication is Pregnancy Category C. It is unknown if this medication is excreted in breast milk. Rinvoq Pregnancy And Lactation Text: Based on animal studies, Rinvoq may cause embryo-fetal harm when administered to pregnant women.  The medication should not be used in pregnancy.  Breastfeeding is not recommended during treatment and for 6 days after the last dose. Tremfya Pregnancy And Lactation Text: The risk during pregnancy and breastfeeding is uncertain with this medication. Prednisone Counseling:  I discussed with the patient the risks of prolonged use of prednisone including but not limited to weight gain, insomnia, osteoporosis, mood changes, diabetes, susceptibility to infection, glaucoma and high blood pressure.  In cases where prednisone use is prolonged, patients should be monitored with blood pressure checks, serum glucose levels and an eye exam.  Additionally, the patient may need to be placed on GI prophylaxis, PCP prophylaxis, and calcium and vitamin D supplementation and/or a bisphosphonate.  The patient verbalized understanding of the proper use and the possible adverse effects of prednisone.  All of the patient's questions and concerns were addressed. Dapsone Counseling: I discussed with the patient the risks of dapsone including but not limited to hemolytic anemia, agranulocytosis, rashes, methemoglobinemia, kidney failure, peripheral neuropathy, headaches, GI upset, and liver toxicity.  Patients who start dapsone require monitoring including baseline LFTs and weekly CBCs for the first month, then every month thereafter.  The patient verbalized understanding of the proper use and possible adverse effects of dapsone.  All of the patient's questions and concerns were addressed. Isotretinoin Counseling: Patient should get monthly blood tests, not donate blood, not drive at night if vision affected, not share medication, and not undergo elective surgery for 6 months after tx completed. Side effects reviewed, pt to contact office should one occur. Skyrizi Counseling: I discussed with the patient the risks of risankizumab-rzaa including but not limited to immunosuppression, and serious infections.  The patient understands that monitoring is required including a PPD at baseline and must alert us or the primary physician if symptoms of infection or other concerning signs are noted. Azathioprine Pregnancy And Lactation Text: This medication is Pregnancy Category D and isn't considered safe during pregnancy. It is unknown if this medication is excreted in breast milk. Cosentyx Pregnancy And Lactation Text: This medication is Pregnancy Category B and is considered safe during pregnancy. It is unknown if this medication is excreted in breast milk. Dutasteride Pregnancy And Lactation Text: This medication is absolutely contraindicated in women, especially during pregnancy and breast feeding. Feminization of male fetuses is possible if taking while pregnant. Topical Ketoconazole Counseling: Patient counseled that this medication may cause skin irritation or allergic reactions.  In the event of skin irritation, the patient was advised to reduce the amount of the drug applied or use it less frequently.   The patient verbalized understanding of the proper use and possible adverse effects of ketoconazole.  All of the patient's questions and concerns were addressed. Azithromycin Counseling:  I discussed with the patient the risks of azithromycin including but not limited to GI upset, allergic reaction, drug rash, diarrhea, and yeast infections. Ivermectin Pregnancy And Lactation Text: This medication is Pregnancy Category C and it isn't known if it is safe during pregnancy. It is also excreted in breast milk. Rhofade Pregnancy And Lactation Text: This medication has not been assigned a Pregnancy Risk Category. It is unknown if the medication is excreted in breast milk. Carac Pregnancy And Lactation Text: This medication is Pregnancy Category X and contraindicated in pregnancy and in women who may become pregnant. It is unknown if this medication is excreted in breast milk. Otezla Pregnancy And Lactation Text: This medication is Pregnancy Category C and it isn't known if it is safe during pregnancy. It is unknown if it is excreted in breast milk. Cimetidine Counseling:  I discussed with the patient the risks of Cimetidine including but not limited to gynecomastia, headache, diarrhea, nausea, drowsiness, arrhythmias, pancreatitis, skin rashes, psychosis, bone marrow suppression and kidney toxicity. Xolair Counseling:  Patient informed of potential adverse effects including but not limited to fever, muscle aches, rash and allergic reactions.  The patient verbalized understanding of the proper use and possible adverse effects of Xolair.  All of the patient's questions and concerns were addressed. Itraconazole Counseling:  I discussed with the patient the risks of itraconazole including but not limited to liver damage, nausea/vomiting, neuropathy, and severe allergy.  The patient understands that this medication is best absorbed when taken with acidic beverages such as non-diet cola or ginger ale.  The patient understands that monitoring is required including baseline LFTs and repeat LFTs at intervals.  The patient understands that they are to contact us or the primary physician if concerning signs are noted. Niacinamide Counseling: I recommended taking niacin or niacinamide, also know as vitamin B3, twice daily. Recent evidence suggests that taking vitamin B3 (500 mg twice daily) can reduce the risk of actinic keratoses and non-melanoma skin cancers. Side effects of vitamin B3 include flushing and headache. Prednisone Pregnancy And Lactation Text: This medication is Pregnancy Category C and it isn't know if it is safe during pregnancy. This medication is excreted in breast milk. Valtrex Pregnancy And Lactation Text: this medication is Pregnancy Category B and is considered safe during pregnancy. This medication is not directly found in breast milk but it's metabolite acyclovir is present. Dutasteride Male Counseling: Dustasteride Counseling:  I discussed with the patient the risks of use of dutasteride including but not limited to decreased libido, decreased ejaculate volume, and gynecomastia. Women who can become pregnant should not handle medication.  All of the patient's questions and concerns were addressed. Rifampin Pregnancy And Lactation Text: This medication is Pregnancy Category C and it isn't know if it is safe during pregnancy. It is also excreted in breast milk and should not be used if you are breast feeding. Erythromycin Counseling:  I discussed with the patient the risks of erythromycin including but not limited to GI upset, allergic reaction, drug rash, diarrhea, increase in liver enzymes, and yeast infections. Dupixent Counseling: I discussed with the patient the risks of dupilumab including but not limited to eye infection and irritation, cold sores, injection site reactions, worsening of asthma, allergic reactions and increased risk of parasitic infection.  Live vaccines should be avoided while taking dupilumab. Dupilumab will also interact with certain medications such as warfarin and cyclosporine. The patient understands that monitoring is required and they must alert us or the primary physician if symptoms of infection or other concerning signs are noted. Sotyktu Counseling:  I discussed the most common side effects of Sotyktu including: common cold, sore throat, sinus infections, cold sores, canker sores, folliculitis, and acne.? I also discussed more serious side effects of Sotyktu including but not limited to: serious allergic reactions; increased risk for infections such as TB; cancers such as lymphomas; rhabdomyolysis and elevated CPK; and elevated triglycerides and liver enzymes.? Topical Ketoconazole Pregnancy And Lactation Text: This medication is Pregnancy Category B and is considered safe during pregnancy. It is unknown if it is excreted in breast milk. Eucrisa Pregnancy And Lactation Text: This medication has not been assigned a Pregnancy Risk Category but animal studies failed to show danger with the topical medication. It is unknown if the medication is excreted in breast milk. Mirvaso Counseling: Mirvaso is a topical medication which can decrease superficial blood flow where applied. Side effects are uncommon and include stinging, redness and allergic reactions. Calcipotriene Counseling:  I discussed with the patient the risks of calcipotriene including but not limited to erythema, scaling, itching, and irritation. Cellcept Counseling:  I discussed with the patient the risks of mycophenolate mofetil including but not limited to infection/immunosuppression, GI upset, hypokalemia, hypercholesterolemia, bone marrow suppression, lymphoproliferative disorders, malignancy, GI ulceration/bleed/perforation, colitis, interstitial lung disease, kidney failure, progressive multifocal leukoencephalopathy, and birth defects.  The patient understands that monitoring is required including a baseline creatinine and regular CBC testing. In addition, patient must alert us immediately if symptoms of infection or other concerning signs are noted. Cimetidine Pregnancy And Lactation Text: This medication is Pregnancy Category B and is considered safe during pregnancy. It is also excreted in breast milk and breast feeding isn't recommended. Finasteride Male Counseling: Finasteride Counseling:  I discussed with the patient the risks of use of finasteride including but not limited to decreased libido, decreased ejaculate volume, gynecomastia, and depression. Women should not handle medication.  All of the patient's questions and concerns were addressed. Isotretinoin Pregnancy And Lactation Text: This medication is Pregnancy Category X and is considered extremely dangerous during pregnancy. It is unknown if it is excreted in breast milk. Oxybutynin Counseling:  I discussed with the patient the risks of oxybutynin including but not limited to skin rash, drowsiness, dry mouth, difficulty urinating, and blurred vision. Opioid Counseling: I discussed with the patient the potential side effects of opioids including but not limited to addiction, altered mental status, and depression. I stressed avoiding alcohol, benzodiazepines, muscle relaxants and sleep aids unless specifically okayed by a physician. The patient verbalized understanding of the proper use and possible adverse effects of opioids. All of the patient's questions and concerns were addressed. They were instructed to flush the remaining pills down the toilet if they did not need them for pain. Azithromycin Pregnancy And Lactation Text: This medication is considered safe during pregnancy and is also secreted in breast milk. Dapsone Pregnancy And Lactation Text: This medication is Pregnancy Category C and is not considered safe during pregnancy or breast feeding. Infliximab Counseling:  I discussed with the patient the risks of infliximab including but not limited to myelosuppression, immunosuppression, autoimmune hepatitis, demyelinating diseases, lymphoma, and serious infections.  The patient understands that monitoring is required including a PPD at baseline and must alert us or the primary physician if symptoms of infection or other concerning signs are noted. Sarecycline Counseling: Patient advised regarding possible photosensitivity and discoloration of the teeth, skin, lips, tongue and gums.  Patient instructed to avoid sunlight, if possible.  When exposed to sunlight, patients should wear protective clothing, sunglasses, and sunscreen.  The patient was instructed to call the office immediately if the following severe adverse effects occur:  hearing changes, easy bruising/bleeding, severe headache, or vision changes.  The patient verbalized understanding of the proper use and possible adverse effects of sarecycline.  All of the patient's questions and concerns were addressed. Solaraze Counseling:  I discussed with the patient the risks of Solaraze including but not limited to erythema, scaling, itching, weeping, crusting, and pain. Dutasteride Female Counseling: Dutasteride Counseling:  I discussed with the patient the risks of use of dutasteride including but not limited to decreased libido and sexual dysfunction. Explained the teratogenic nature of the medication and stressed the importance of not getting pregnant during treatment. All of the patient's questions and concerns were addressed. Sotyktu Pregnancy And Lactation Text: There is insufficient data to evaluate whether or not Sotyktu is safe to use during pregnancy.? ?It is not known if Sotyktu passes into breast milk and whether or not it is safe to use when breastfeeding.?? Xolair Pregnancy And Lactation Text: This medication is Pregnancy Category B and is considered safe during pregnancy. This medication is excreted in breast milk. Dupixent Pregnancy And Lactation Text: This medication likely crosses the placenta but the risk for the fetus is uncertain. This medication is excreted in breast milk. Niacinamide Pregnancy And Lactation Text: These medications are considered safe during pregnancy. Hydroquinone Counseling:  Patient advised that medication may result in skin irritation, lightening (hypopigmentation), dryness, and burning.  In the event of skin irritation, the patient was advised to reduce the amount of the drug applied or use it less frequently.  Rarely, spots that are treated with hydroquinone can become darker (pseudoochronosis).  Should this occur, patient instructed to stop medication and call the office. The patient verbalized understanding of the proper use and possible adverse effects of hydroquinone.  All of the patient's questions and concerns were addressed. Finasteride Female Counseling: Finasteride Counseling:  I discussed with the patient the risks of use of finasteride including but not limited to decreased libido and sexual dysfunction. Explained the teratogenic nature of the medication and stressed the importance of not getting pregnant during treatment. All of the patient's questions and concerns were addressed. High Dose Vitamin A Counseling: Side effects reviewed, pt to contact office should one occur. Erythromycin Pregnancy And Lactation Text: This medication is Pregnancy Category B and is considered safe during pregnancy. It is also excreted in breast milk. Gabapentin Counseling: I discussed with the patient the risks of gabapentin including but not limited to dizziness, somnolence, fatigue and ataxia. Topical Sulfur Applications Counseling: Topical Sulfur Counseling: Patient counseled that this medication may cause skin irritation or allergic reactions.  In the event of skin irritation, the patient was advised to reduce the amount of the drug applied or use it less frequently.   The patient verbalized understanding of the proper use and possible adverse effects of topical sulfur application.  All of the patient's questions and concerns were addressed. Cibinqo Counseling: I discussed with the patient the risks of Cibinqo therapy including but not limited to common cold, nausea, headache, cold sores, increased blood CPK levels, dizziness, UTIs, fatigue, acne, and vomitting. Live vaccines should be avoided.  This medication has been linked to serious infections; higher rate of mortality; malignancy and lymphoproliferative disorders; major adverse cardiovascular events; thrombosis; thrombocytopenia and lymphopenia; lipid elevations; and retinal detachment. Calcipotriene Pregnancy And Lactation Text: This medication has not been proven safe during pregnancy. It is unknown if this medication is excreted in breast milk. Spevigo Counseling: I discussed with the patient the risks of Spevigo including but not limited to fatigue, nasuea, vomiting, headache, pruritus, urinary tract infection, an infusion related reactions.  The patient understands that monitoring is required including screening for tuberculosis at baseline and yearly screening thereafter while continuing Spevigo therapy. They should contact us if symptoms of infection or other concerning signs are noted. Use Enhanced Medication Counseling?: No Opioid Pregnancy And Lactation Text: These medications can lead to premature delivery and should be avoided during pregnancy. These medications are also present in breast milk in small amounts. Doxepin Counseling:  Patient advised that the medication is sedating and not to drive a car after taking this medication. Patient informed of potential adverse effects including but not limited to dry mouth, urinary retention, and blurry vision.  The patient verbalized understanding of the proper use and possible adverse effects of doxepin.  All of the patient's questions and concerns were addressed. Bactrim Counseling:  I discussed with the patient the risks of sulfa antibiotics including but not limited to GI upset, allergic reaction, drug rash, diarrhea, dizziness, photosensitivity, and yeast infections.  Rarely, more serious reactions can occur including but not limited to aplastic anemia, agranulocytosis, methemoglobinemia, blood dyscrasias, liver or kidney failure, lung infiltrates or desquamative/blistering drug rashes. Adbry Counseling: I discussed with the patient the risks of tralokinumab including but not limited to eye infection and irritation, cold sores, injection site reactions, worsening of asthma, allergic reactions and increased risk of parasitic infection.  Live vaccines should be avoided while taking tralokinumab. The patient understands that monitoring is required and they must alert us or the primary physician if symptoms of infection or other concerning signs are noted. Solaraze Pregnancy And Lactation Text: This medication is Pregnancy Category B and is considered safe. There is some data to suggest avoiding during the third trimester. It is unknown if this medication is excreted in breast milk. Detail Level: Detailed Opzelura Counseling:  I discussed with the patient the risks of Opzelura including but not limited to nasopharngitis, bronchitis, ear infection, eosinophila, hives, diarrhea, folliculitis, tonsillitis, and rhinorrhea.  Taken orally, this medication has been linked to serious infections; higher rate of mortality; malignancy and lymphoproliferative disorders; major adverse cardiovascular events; thrombosis; thrombocytopenia, anemia, and neutropenia; and lipid elevations. Sarecycline Pregnancy And Lactation Text: This medication is Pregnancy Category D and not consider safe during pregnancy. It is also excreted in breast milk. Gabapentin Pregnancy And Lactation Text: This medication is Pregnancy Category C and isn't considered safe during pregnancy. It is excreted in breast milk. Cyclophosphamide Counseling:  I discussed with the patient the risks of cyclophosphamide including but not limited to hair loss, hormonal abnormalities, decreased fertility, abdominal pain, diarrhea, nausea and vomiting, bone marrow suppression and infection. The patient understands that monitoring is required while taking this medication. High Dose Vitamin A Pregnancy And Lactation Text: High dose vitamin A therapy is contraindicated during pregnancy and breast feeding. Metronidazole Counseling:  I discussed with the patient the risks of metronidazole including but not limited to seizures, nausea/vomiting, a metallic taste in the mouth, nausea/vomiting and severe allergy. Enbrel Counseling:  I discussed with the patient the risks of etanercept including but not limited to myelosuppression, immunosuppression, autoimmune hepatitis, demyelinating diseases, lymphoma, and infections.  The patient understands that monitoring is required including a PPD at baseline and must alert us or the primary physician if symptoms of infection or other concerning signs are noted. Finasteride Pregnancy And Lactation Text: This medication is absolutely contraindicated during pregnancy. It is unknown if it is excreted in breast milk. Nsaids Counseling: NSAID Counseling: I discussed with the patient that NSAIDs should be taken with food. Prolonged use of NSAIDs can result in the development of stomach ulcers.  Patient advised to stop taking NSAIDs if abdominal pain occurs.  The patient verbalized understanding of the proper use and possible adverse effects of NSAIDs.  All of the patient's questions and concerns were addressed. Xeljanz Counseling: I discussed with the patient the risks of Xeljanz therapy including increased risk of infection, liver issues, headache, diarrhea, or cold symptoms. Live vaccines should be avoided. They were instructed to call if they have any problems. Topical Sulfur Applications Pregnancy And Lactation Text: This medication is considered safe during pregnancy and breast feeding secondary to limited systemic absorption. Spevigo Pregnancy And Lactation Text: The risk during pregnancy and breastfeeding is uncertain with this medication. This medication does cross the placenta. It is unknown if this medication is found in breast milk. Adbry Pregnancy And Lactation Text: It is unknown if this medication will adversely affect pregnancy or breast feeding. Cibinqo Pregnancy And Lactation Text: It is unknown if this medication will adversely affect pregnancy or breast feeding.  You should not take this medication if you are currently pregnant or planning a pregnancy or while breastfeeding. Topical Retinoid counseling:  Patient advised to apply a pea-sized amount only at bedtime and wait 30 minutes after washing their face before applying.  If too drying, patient may add a non-comedogenic moisturizer. The patient verbalized understanding of the proper use and possible adverse effects of retinoids.  All of the patient's questions and concerns were addressed. 5-Fu Counseling: 5-Fluorouracil Counseling:  I discussed with the patient the risks of 5-fluorouracil including but not limited to erythema, scaling, itching, weeping, crusting, and pain. Erivedge Counseling- I discussed with the patient the risks of Erivedge including but not limited to nausea, vomiting, diarrhea, constipation, weight loss, changes in the sense of taste, decreased appetite, muscle spasms, and hair loss.  The patient verbalized understanding of the proper use and possible adverse effects of Erivedge.  All of the patient's questions and concerns were addressed. Doxepin Pregnancy And Lactation Text: This medication is Pregnancy Category C and it isn't known if it is safe during pregnancy. It is also excreted in breast milk and breast feeding isn't recommended. Tetracycline Counseling: Patient counseled regarding possible photosensitivity and increased risk for sunburn.  Patient instructed to avoid sunlight, if possible.  When exposed to sunlight, patients should wear protective clothing, sunglasses, and sunscreen.  The patient was instructed to call the office immediately if the following severe adverse effects occur:  hearing changes, easy bruising/bleeding, severe headache, or vision changes.  The patient verbalized understanding of the proper use and possible adverse effects of tetracycline.  All of the patient's questions and concerns were addressed. Patient understands to avoid pregnancy while on therapy due to potential birth defects. Rituxan Counseling:  I discussed with the patient the risks of Rituxan infusions. Side effects can include infusion reactions, severe drug rashes including mucocutaneous reactions, reactivation of latent hepatitis and other infections and rarely progressive multifocal leukoencephalopathy.  All of the patient's questions and concerns were addressed. Bactrim Pregnancy And Lactation Text: This medication is Pregnancy Category D and is known to cause fetal risk.  It is also excreted in breast milk. Imiquimod Counseling:  I discussed with the patient the risks of imiquimod including but not limited to erythema, scaling, itching, weeping, crusting, and pain.  Patient understands that the inflammatory response to imiquimod is variable from person to person and was educated regarded proper titration schedule.  If flu-like symptoms develop, patient knows to discontinue the medication and contact us. Propranolol Counseling:  I discussed with the patient the risks of propranolol including but not limited to low heart rate, low blood pressure, low blood sugar, restlessness and increased cold sensitivity. They should call the office if they experience any of these side effects. Arava Counseling:  Patient counseled regarding adverse effects of Arava including but not limited to nausea, vomiting, abnormalities in liver function tests. Patients may develop mouth sores, rash, diarrhea, and abnormalities in blood counts. The patient understands that monitoring is required including LFTs and blood counts.  There is a rare possibility of scarring of the liver and lung problems that can occur when taking methotrexate. Persistent nausea, loss of appetite, pale stools, dark urine, cough, and shortness of breath should be reported immediately. Patient advised to discontinue Arava treatment and consult with a physician prior to attempting conception. The patient will have to undergo a treatment to eliminate Arava from the body prior to conception. Nsaids Pregnancy And Lactation Text: These medications are considered safe up to 30 weeks gestation. It is excreted in breast milk. Wartpeel Counseling:  I discussed with the patient the risks of Wartpeel including but not limited to erythema, scaling, itching, weeping, crusting, and pain. Metronidazole Pregnancy And Lactation Text: This medication is Pregnancy Category B and considered safe during pregnancy.  It is also excreted in breast milk. Birth Control Pills Counseling: Birth Control Pill Counseling: I discussed with the patient the potential side effects of OCPs including but not limited to increased risk of stroke, heart attack, thrombophlebitis, deep venous thrombosis, hepatic adenomas, breast changes, GI upset, headaches, and depression.  The patient verbalized understanding of the proper use and possible adverse effects of OCPs. All of the patient's questions and concerns were addressed. Xeltheodorez Pregnancy And Lactation Text: This medication is Pregnancy Category D and is not considered safe during pregnancy.  The risk during breast feeding is also uncertain. Opzelura Pregnancy And Lactation Text: There is insufficient data to evaluate drug-associated risk for major birth defects, miscarriage, or other adverse maternal or fetal outcomes.  There is a pregnancy registry that monitors pregnancy outcomes in pregnant persons exposed to the medication during pregnancy.  It is unknown if this medication is excreted in breast milk.  Do not breastfeed during treatment and for about 4 weeks after the last dose. Hydroxyzine Counseling: Patient advised that the medication is sedating and not to drive a car after taking this medication.  Patient informed of potential adverse effects including but not limited to dry mouth, urinary retention, and blurry vision.  The patient verbalized understanding of the proper use and possible adverse effects of hydroxyzine.  All of the patient's questions and concerns were addressed. Glycopyrrolate Counseling:  I discussed with the patient the risks of glycopyrrolate including but not limited to skin rash, drowsiness, dry mouth, difficulty urinating, and blurred vision. Cyclophosphamide Pregnancy And Lactation Text: This medication is Pregnancy Category D and it isn't considered safe during pregnancy. This medication is excreted in breast milk. Bimzelx Counseling:  I discussed with the patient the risks of Bimzelx including but not limited to depression, immunosuppression, allergic reactions and infections.  The patient understands that monitoring is required including a PPD at baseline and must alert us or the primary physician if symptoms of infection or other concerning signs are noted. Stelara Counseling:  I discussed with the patient the risks of ustekinumab including but not limited to immunosuppression, malignancy, posterior leukoencephalopathy syndrome, and serious infections.  The patient understands that monitoring is required including a PPD at baseline and must alert us or the primary physician if symptoms of infection or other concerning signs are noted. Itraconazole Pregnancy And Lactation Text: This medication is Pregnancy Category C and it isn't know if it is safe during pregnancy. It is also excreted in breast milk. Cephalexin Counseling: I counseled the patient regarding use of cephalexin as an antibiotic for prophylactic and/or therapeutic purposes. Cephalexin (commonly prescribed under brand name Keflex) is a cephalosporin antibiotic which is active against numerous classes of bacteria, including most skin bacteria. Side effects may include nausea, diarrhea, gastrointestinal upset, rash, hives, yeast infections, and in rare cases, hepatitis, kidney disease, seizures, fever, confusion, neurologic symptoms, and others. Patients with severe allergies to penicillin medications are cautioned that there is about a 10% incidence of cross-reactivity with cephalosporins. When possible, patients with penicillin allergies should use alternatives to cephalosporins for antibiotic therapy. Litfulo Counseling: Litfulo (Ritlecitinib) Counseling: I discussed with the patient the risks of Litfulo therapy including but not limited to headache, upper respiratory infections, nausea, diarrhea, acne, and urticaria. Live vaccines should be avoided.  This medication has been linked to serious infections; higher rate of mortality; malignancy and lymphoproliferative disorders; major adverse cardiovascular events; thrombosis; decreases in lymphocytes and platelets; liver enzyme elevations; and CPK elevations. Arava Pregnancy And Lactation Text: This medication is Pregnancy Category X and is absolutely contraindicated during pregnancy. It is unknown if it is excreted in breast milk. Azelaic Acid Counseling: Patient counseled that medicine may cause skin irritation and to avoid applying near the eyes.  In the event of skin irritation, the patient was advised to reduce the amount of the drug applied or use it less frequently.   The patient verbalized understanding of the proper use and possible adverse effects of azelaic acid.  All of the patient's questions and concerns were addressed. Propranolol Pregnancy And Lactation Text: This medication is Pregnancy Category C and it isn't known if it is safe during pregnancy. It is excreted in breast milk. Picato Counseling:  I discussed with the patient the risks of Picato including but not limited to erythema, scaling, itching, weeping, crusting, and pain. Olanzapine Counseling- I discussed with the patient the common side effects of olanzapine including but are not limited to: lack of energy, dry mouth, increased appetite, sleepiness, tremor, constipation, dizziness, changes in behavior, or restlessness.  Explained that teenagers are more likely to experience headaches, abdominal pain, pain in the arms or legs, tiredness, and sleepiness.  Serious side effects include but are not limited: increased risk of death in elderly patients who are confused, have memory loss, or dementia-related psychosis; hyperglycemia; increased cholesterol and triglycerides; and weight gain. Rituxan Pregnancy And Lactation Text: This medication is Pregnancy Category C and it isn't know if it is safe during pregnancy. It is unknown if this medication is excreted in breast milk but similar antibodies are known to be excreted. Birth Control Pills Pregnancy And Lactation Text: This medication should be avoided if pregnant and for the first 30 days post-partum. Drysol Counseling:  I discussed with the patient the risks of drysol/aluminum chloride including but not limited to skin rash, itching, irritation, burning. Minocycline Counseling: Patient advised regarding possible photosensitivity and discoloration of the teeth, skin, lips, tongue and gums.  Patient instructed to avoid sunlight, if possible.  When exposed to sunlight, patients should wear protective clothing, sunglasses, and sunscreen.  The patient was instructed to call the office immediately if the following severe adverse effects occur:  hearing changes, easy bruising/bleeding, severe headache, or vision changes.  The patient verbalized understanding of the proper use and possible adverse effects of minocycline.  All of the patient's questions and concerns were addressed. Humira Counseling:  I discussed with the patient the risks of adalimumab including but not limited to myelosuppression, immunosuppression, autoimmune hepatitis, demyelinating diseases, lymphoma, and serious infections.  The patient understands that monitoring is required including a PPD at baseline and must alert us or the primary physician if symptoms of infection or other concerning signs are noted. Ketoconazole Counseling:   Patient counseled regarding improving absorption with orange juice.  Adverse effects include but are not limited to breast enlargement, headache, diarrhea, nausea, upset stomach, liver function test abnormalities, taste disturbance, and stomach pain.  There is a rare possibility of liver failure that can occur when taking ketoconazole. The patient understands that monitoring of LFTs may be required, especially at baseline. The patient verbalized understanding of the proper use and possible adverse effects of ketoconazole.  All of the patient's questions and concerns were addressed. Cyclosporine Counseling:  I discussed with the patient the risks of cyclosporine including but not limited to hypertension, gingival hyperplasia,myelosuppression, immunosuppression, liver damage, kidney damage, neurotoxicity, lymphoma, and serious infections. The patient understands that monitoring is required including baseline blood pressure, CBC, CMP, lipid panel and uric acid, and then 1-2 times monthly CMP and blood pressure. SSKI Counseling:  I discussed with the patient the risks of SSKI including but not limited to thyroid abnormalities, metallic taste, GI upset, fever, headache, acne, arthralgias, paraesthesias, lymphadenopathy, easy bleeding, arrhythmias, and allergic reaction. Hydroxyzine Pregnancy And Lactation Text: This medication is not safe during pregnancy and should not be taken. It is also excreted in breast milk and breast feeding isn't recommended. Acitretin Counseling:  I discussed with the patient the risks of acitretin including but not limited to hair loss, dry lips/skin/eyes, liver damage, hyperlipidemia, depression/suicidal ideation, photosensitivity.  Serious rare side effects can include but are not limited to pancreatitis, pseudotumor cerebri, bony changes, clot formation/stroke/heart attack.  Patient understands that alcohol is contraindicated since it can result in liver toxicity and significantly prolong the elimination of the drug by many years. Cephalexin Pregnancy And Lactation Text: This medication is Pregnancy Category B and considered safe during pregnancy.  It is also excreted in breast milk but can be used safely for shorter doses. Bimzelx Pregnancy And Lactation Text: This medication crosses the placenta and the safety is uncertain during pregnancy. It is unknown if this medication is present in breast milk. Libtayo Counseling- I discussed with the patient the risks of Libtayo including but not limited to nausea, vomiting, diarrhea, and bone or muscle pain.  The patient verbalized understanding of the proper use and possible adverse effects of Libtayo.  All of the patient's questions and concerns were addressed. Glycopyrrolate Pregnancy And Lactation Text: This medication is Pregnancy Category B and is considered safe during pregnancy. It is unknown if it is excreted breast milk. Litfulo Pregnancy And Lactation Text: There is insufficient data to evaluate whether or not Litfulo is safe to use during pregnancy.  Breastfeeding is not recommended during treatment. Tazorac Counseling:  Patient advised that medication is irritating and drying.  Patient may need to apply sparingly and wash off after an hour before eventually leaving it on overnight.  The patient verbalized understanding of the proper use and possible adverse effects of tazorac.  All of the patient's questions and concerns were addressed. Clofazimine Counseling:  I discussed with the patient the risks of clofazimine including but not limited to skin and eye pigmentation, liver damage, nausea/vomiting, gastrointestinal bleeding and allergy. Minoxidil Counseling: Minoxidil is a topical medication which can increase blood flow where it is applied. It is uncertain how this medication increases hair growth. Side effects are uncommon and include stinging and allergic reactions. Siliq Counseling:  I discussed with the patient the risks of Siliq including but not limited to new or worsening depression, suicidal thoughts and behavior, immunosuppression, malignancy, posterior leukoencephalopathy syndrome, and serious infections.  The patient understands that monitoring is required including a PPD at baseline and must alert us or the primary physician if symptoms of infection or other concerning signs are noted. There is also a special program designed to monitor depression which is required with Siliq. Azelaic Acid Pregnancy And Lactation Text: This medication is considered safe during pregnancy and breast feeding. Fluconazole Counseling:  Patient counseled regarding adverse effects of fluconazole including but not limited to headache, diarrhea, nausea, upset stomach, liver function test abnormalities, taste disturbance, and stomach pain.  There is a rare possibility of liver failure that can occur when taking fluconazole.  The patient understands that monitoring of LFTs and kidney function test may be required, especially at baseline. The patient verbalized understanding of the proper use and possible adverse effects of fluconazole.  All of the patient's questions and concerns were addressed. Olanzapine Pregnancy And Lactation Text: This medication is pregnancy category C.   There are no adequate and well controlled trials with olanzapine in pregnant females.  Olanzapine should be used during pregnancy only if the potential benefit justifies the potential risk to the fetus.   In a study in lactating healthy women, olanzapine was excreted in breast milk.  It is recommended that women taking olanzapine should not breast feed. Winlevi Counseling:  I discussed with the patient the risks of topical clascoterone including but not limited to erythema, scaling, itching, and stinging. Patient voiced their understanding. Libtayo Pregnancy And Lactation Text: This medication is contraindicated in pregnancy and when breast feeding. Tazorac Pregnancy And Lactation Text: This medication is not safe during pregnancy. It is unknown if this medication is excreted in breast milk. Hydroxychloroquine Counseling:  I discussed with the patient that a baseline ophthalmologic exam is needed at the start of therapy and every year thereafter while on therapy. A CBC may also be warranted for monitoring.  The side effects of this medication were discussed with the patient, including but not limited to agranulocytosis, aplastic anemia, seizures, rashes, retinopathy, and liver toxicity. Patient instructed to call the office should any adverse effect occur.  The patient verbalized understanding of the proper use and possible adverse effects of Plaquenil.  All the patient's questions and concerns were addressed. Spironolactone Counseling: Patient advised regarding risks of diarrhea, abdominal pain, hyperkalemia, birth defects (for female patients), liver toxicity and renal toxicity. The patient may need blood work to monitor liver and kidney function and potassium levels while on therapy. The patient verbalized understanding of the proper use and possible adverse effects of spironolactone.  All of the patient's questions and concerns were addressed. Olumiant Counseling: I discussed with the patient the risks of Olumiant therapy including but not limited to upper respiratory tract infections, shingles, cold sores, and nausea. Live vaccines should be avoided.  This medication has been linked to serious infections; higher rate of mortality; malignancy and lymphoproliferative disorders; major adverse cardiovascular events; thrombosis; gastrointestinal perforations; neutropenia; lymphopenia; anemia; liver enzyme elevations; and lipid elevations. Ketoconazole Pregnancy And Lactation Text: This medication is Pregnancy Category C and it isn't know if it is safe during pregnancy. It is also excreted in breast milk and breast feeding isn't recommended. Taltz Counseling: I discussed with the patient the risks of ixekizumab including but not limited to immunosuppression, serious infections, worsening of inflammatory bowel disease and drug reactions.  The patient understands that monitoring is required including a PPD at baseline and must alert us or the primary physician if symptoms of infection or other concerning signs are noted. Benzoyl Peroxide Counseling: Patient counseled that medicine may cause skin irritation and bleach clothing.  In the event of skin irritation, the patient was advised to reduce the amount of the drug applied or use it less frequently.   The patient verbalized understanding of the proper use and possible adverse effects of benzoyl peroxide.  All of the patient's questions and concerns were addressed. Acitretin Pregnancy And Lactation Text: This medication is Pregnancy Category X and should not be given to women who are pregnant or may become pregnant in the future. This medication is excreted in breast milk. Clindamycin Counseling: I counseled the patient regarding use of clindamycin as an antibiotic for prophylactic and/or therapeutic purposes. Clindamycin is active against numerous classes of bacteria, including skin bacteria. Side effects may include nausea, diarrhea, gastrointestinal upset, rash, hives, yeast infections, and in rare cases, colitis. Sski Pregnancy And Lactation Text: This medication is Pregnancy Category D and isn't considered safe during pregnancy. It is excreted in breast milk. Cimzia Counseling:  I discussed with the patient the risks of Cimzia including but not limited to immunosuppression, allergic reactions and infections.  The patient understands that monitoring is required including a PPD at baseline and must alert us or the primary physician if symptoms of infection or other concerning signs are noted. Oral Minoxidil Counseling- I discussed with the patient the risks of oral minoxidil including but not limited to shortness of breath, swelling of the feet or ankles, dizziness, lightheadedness, unwanted hair growth and allergic reaction.  The patient verbalized understanding of the proper use and possible adverse effects of oral minoxidil.  All of the patient's questions and concerns were addressed. Quinolones Counseling:  I discussed with the patient the risks of fluoroquinolones including but not limited to GI upset, allergic reaction, drug rash, diarrhea, dizziness, photosensitivity, yeast infections, liver function test abnormalities, tendonitis/tendon rupture. Albendazole Counseling:  I discussed with the patient the risks of albendazole including but not limited to cytopenia, kidney damage, nausea/vomiting and severe allergy.  The patient understands that this medication is being used in an off-label manner. Protopic Counseling: Patient may experience a mild burning sensation during topical application. Protopic is not approved in children less than 2 years of age. There have been case reports of hematologic and skin malignancies in patients using topical calcineurin inhibitors although causality is questionable. Hydroxychloroquine Pregnancy And Lactation Text: This medication has been shown to cause fetal harm but it isn't assigned a Pregnancy Risk Category. There are small amounts excreted in breast milk. Tranexamic Acid Counseling:  Patient advised of the small risk of bleeding problems with tranexamic acid. They were also instructed to call if they developed any nausea, vomiting or diarrhea. All of the patient's questions and concerns were addressed. Methotrexate Counseling:  Patient counseled regarding adverse effects of methotrexate including but not limited to nausea, vomiting, abnormalities in liver function tests. Patients may develop mouth sores, rash, diarrhea, and abnormalities in blood counts. The patient understands that monitoring is required including LFT's and blood counts.  There is a rare possibility of scarring of the liver and lung problems that can occur when taking methotrexate. Persistent nausea, loss of appetite, pale stools, dark urine, cough, and shortness of breath should be reported immediately. Patient advised to discontinue methotrexate treatment at least three months before attempting to become pregnant.  I discussed the need for folate supplements while taking methotrexate.  These supplements can decrease side effects during methotrexate treatment. The patient verbalized understanding of the proper use and possible adverse effects of methotrexate.  All of the patient's questions and concerns were addressed. Elidel Counseling: Patient may experience a mild burning sensation during topical application. Elidel is not approved in children less than 2 years of age. There have been case reports of hematologic and skin malignancies in patients using topical calcineurin inhibitors although causality is questionable. Terbinafine Counseling: Patient counseling regarding adverse effects of terbinafine including but not limited to headache, diarrhea, rash, upset stomach, liver function test abnormalities, itching, taste/smell disturbance, nausea, abdominal pain, and flatulence.  There is a rare possibility of liver failure that can occur when taking terbinafine.  The patient understands that a baseline LFT and kidney function test may be required. The patient verbalized understanding of the proper use and possible adverse effects of terbinafine.  All of the patient's questions and concerns were addressed. Hyrimoz Counseling:  I discussed with the patient the risks of adalimumab including but not limited to myelosuppression, immunosuppression, autoimmune hepatitis, demyelinating diseases, lymphoma, and serious infections.  The patient understands that monitoring is required including a PPD at baseline and must alert us or the primary physician if symptoms of infection or other concerning signs are noted. Spironolactone Pregnancy And Lactation Text: This medication can cause feminization of the male fetus and should be avoided during pregnancy. The active metabolite is also found in breast milk. Winlevi Pregnancy And Lactation Text: This medication is considered safe during pregnancy and breastfeeding. Bexarotene Counseling:  I discussed with the patient the risks of bexarotene including but not limited to hair loss, dry lips/skin/eyes, liver abnormalities, hyperlipidemia, pancreatitis, depression/suicidal ideation, photosensitivity, drug rash/allergic reactions, hypothyroidism, anemia, leukopenia, infection, cataracts, and teratogenicity.  Patient understands that they will need regular blood tests to check lipid profile, liver function tests, white blood cell count, thyroid function tests and pregnancy test if applicable. Clindamycin Pregnancy And Lactation Text: This medication can be used in pregnancy if certain situations. Clindamycin is also present in breast milk. Cimzia Pregnancy And Lactation Text: This medication crosses the placenta but can be considered safe in certain situations. Cimzia may be excreted in breast milk. Topical Clindamycin Counseling: Patient counseled that this medication may cause skin irritation or allergic reactions.  In the event of skin irritation, the patient was advised to reduce the amount of the drug applied or use it less frequently.   The patient verbalized understanding of the proper use and possible adverse effects of clindamycin.  All of the patient's questions and concerns were addressed. Olumiant Pregnancy And Lactation Text: Based on animal studies, Olumiant may cause embryo-fetal harm when administered to pregnant women.  The medication should not be used in pregnancy.  Breastfeeding is not recommended during treatment. Odomzo Counseling- I discussed with the patient the risks of Odomzo including but not limited to nausea, vomiting, diarrhea, constipation, weight loss, changes in the sense of taste, decreased appetite, muscle spasms, and hair loss.  The patient verbalized understanding of the proper use and possible adverse effects of Odomzo.  All of the patient's questions and concerns were addressed. Benzoyl Peroxide Pregnancy And Lactation Text: This medication is Pregnancy Category C. It is unknown if benzoyl peroxide is excreted in breast milk. Thalidomide Counseling: I discussed with the patient the risks of thalidomide including but not limited to birth defects, anxiety, weakness, chest pain, dizziness, cough and severe allergy. Oral Minoxidil Pregnancy And Lactation Text: This medication should only be used when clearly needed if you are pregnant, attempting to become pregnant or breast feeding. Simponi Counseling:  I discussed with the patient the risks of golimumab including but not limited to myelosuppression, immunosuppression, autoimmune hepatitis, demyelinating diseases, lymphoma, and serious infections.  The patient understands that monitoring is required including a PPD at baseline and must alert us or the primary physician if symptoms of infection or other concerning signs are noted. Protopic Pregnancy And Lactation Text: This medication is Pregnancy Category C. It is unknown if this medication is excreted in breast milk when applied topically. Colchicine Counseling:  Patient counseled regarding adverse effects including but not limited to stomach upset (nausea, vomiting, stomach pain, or diarrhea).  Patient instructed to limit alcohol consumption while taking this medication.  Colchicine may reduce blood counts especially with prolonged use.  The patient understands that monitoring of kidney function and blood counts may be required, especially at baseline. The patient verbalized understanding of the proper use and possible adverse effects of colchicine.  All of the patient's questions and concerns were addressed. Zyclara Counseling:  I discussed with the patient the risks of imiquimod including but not limited to erythema, scaling, itching, weeping, crusting, and pain.  Patient understands that the inflammatory response to imiquimod is variable from person to person and was educated regarded proper titration schedule.  If flu-like symptoms develop, patient knows to discontinue the medication and contact us. Griseofulvin Counseling:  I discussed with the patient the risks of griseofulvin including but not limited to photosensitivity, cytopenia, liver damage, nausea/vomiting and severe allergy.  The patient understands that this medication is best absorbed when taken with a fatty meal (e.g., ice cream or french fries). Methotrexate Pregnancy And Lactation Text: This medication is Pregnancy Category X and is known to cause fetal harm. This medication is excreted in breast milk. Tranexamic Acid Pregnancy And Lactation Text: It is unknown if this medication is safe during pregnancy or breast feeding. Low Dose Naltrexone Counseling- I discussed with the patient the potential risks and side effects of low dose naltrexone including but not limited to: more vivid dreams, headaches, nausea, vomiting, abdominal pain, fatigue, dizziness, and anxiety. Tremfya Counseling: I discussed with the patient the risks of guselkumab including but not limited to immunosuppression, serious infections, and drug reactions.  The patient understands that monitoring is required including a PPD at baseline and must alert us or the primary physician if symptoms of infection or other concerning signs are noted. Bexarotene Pregnancy And Lactation Text: This medication is Pregnancy Category X and should not be given to women who are pregnant or may become pregnant. This medication should not be used if you are breast feeding. Doxycycline Counseling:  Patient counseled regarding possible photosensitivity and increased risk for sunburn.  Patient instructed to avoid sunlight, if possible.  When exposed to sunlight, patients should wear protective clothing, sunglasses, and sunscreen.  The patient was instructed to call the office immediately if the following severe adverse effects occur:  hearing changes, easy bruising/bleeding, severe headache, or vision changes.  The patient verbalized understanding of the proper use and possible adverse effects of doxycycline.  All of the patient's questions and concerns were addressed. Rinvoq Counseling: I discussed with the patient the risks of Rinvoq therapy including but not limited to upper respiratory tract infections, shingles, cold sores, bronchitis, nausea, cough, fever, acne, and headache. Live vaccines should be avoided.  This medication has been linked to serious infections; higher rate of mortality; malignancy and lymphoproliferative disorders; major adverse cardiovascular events; thrombosis; thrombocytopenia, anemia, and neutropenia; lipid elevations; liver enzyme elevations; and gastrointestinal perforations. Carac Counseling:  I discussed with the patient the risks of Carac including but not limited to erythema, scaling, itching, weeping, crusting, and pain. Cosentyx Counseling:  I discussed with the patient the risks of Cosentyx including but not limited to worsening of Crohn's disease, immunosuppression, allergic reactions and infections.  The patient understands that monitoring is required including a PPD at baseline and must alert us or the primary physician if symptoms of infection or other concerning signs are noted. Azathioprine Counseling:  I discussed with the patient the risks of azathioprine including but not limited to myelosuppression, immunosuppression, hepatotoxicity, lymphoma, and infections.  The patient understands that monitoring is required including baseline LFTs, Creatinine, possible TPMP genotyping and weekly CBCs for the first month and then every 2 weeks thereafter.  The patient verbalized understanding of the proper use and possible adverse effects of azathioprine.  All of the patient's questions and concerns were addressed. Rhofade Counseling: Rhofade is a topical medication which can decrease superficial blood flow where applied. Side effects are uncommon and include stinging, redness and allergic reactions. Ilumya Counseling: I discussed with the patient the risks of tildrakizumab including but not limited to immunosuppression, malignancy, posterior leukoencephalopathy syndrome, and serious infections.  The patient understands that monitoring is required including a PPD at baseline and must alert us or the primary physician if symptoms of infection or other concerning signs are noted. Otezla Counseling: The side effects of Otezla were discussed with the patient, including but not limited to worsening or new depression, weight loss, diarrhea, nausea, upper respiratory tract infection, and headache. Patient instructed to call the office should any adverse effect occur.  The patient verbalized understanding of the proper use and possible adverse effects of Otezla.  All the patient's questions and concerns were addressed. Rifampin Counseling: I discussed with the patient the risks of rifampin including but not limited to liver damage, kidney damage, red-orange body fluids, nausea/vomiting and severe allergy. Ivermectin Counseling:  Patient instructed to take medication on an empty stomach with a full glass of water.  Patient informed of potential adverse effects including but not limited to nausea, diarrhea, dizziness, itching, and swelling of the extremities or lymph nodes.  The patient verbalized understanding of the proper use and possible adverse effects of ivermectin.  All of the patient's questions and concerns were addressed.

## 2024-10-08 ENCOUNTER — APPOINTMENT (OUTPATIENT)
Dept: GASTROENTEROLOGY | Facility: CLINIC | Age: 59
End: 2024-10-08
Payer: COMMERCIAL

## 2024-10-08 ENCOUNTER — NON-APPOINTMENT (OUTPATIENT)
Age: 59
End: 2024-10-08

## 2024-10-08 VITALS
HEART RATE: 88 BPM | DIASTOLIC BLOOD PRESSURE: 80 MMHG | OXYGEN SATURATION: 98 % | SYSTOLIC BLOOD PRESSURE: 122 MMHG | BODY MASS INDEX: 24.38 KG/M2 | HEIGHT: 72 IN | WEIGHT: 180 LBS

## 2024-10-08 DIAGNOSIS — K86.89 OTHER SPECIFIED DISEASES OF PANCREAS: ICD-10-CM

## 2024-10-08 DIAGNOSIS — K83.8 OTHER SPECIFIED DISEASES OF BILIARY TRACT: ICD-10-CM

## 2024-10-08 PROCEDURE — 99214 OFFICE O/P EST MOD 30 MIN: CPT

## 2024-10-14 LAB
ALBUMIN SERPL ELPH-MCNC: 4.3 G/DL
ALP BLD-CCNC: 74 U/L
ALT SERPL-CCNC: 22 U/L
AMYLASE/CREAT SERPL: 55 U/L
ANION GAP SERPL CALC-SCNC: 13 MMOL/L
AST SERPL-CCNC: 31 U/L
BILIRUB SERPL-MCNC: 0.2 MG/DL
BUN SERPL-MCNC: 11 MG/DL
CALCIUM SERPL-MCNC: 9.7 MG/DL
CHLORIDE SERPL-SCNC: 103 MMOL/L
CO2 SERPL-SCNC: 25 MMOL/L
CREAT SERPL-MCNC: 0.78 MG/DL
EGFR: 103 ML/MIN/1.73M2
GLUCOSE SERPL-MCNC: 111 MG/DL
HCT VFR BLD CALC: 35.7 %
HGB BLD-MCNC: 11.8 G/DL
LPL SERPL-CCNC: 18 U/L
MCHC RBC-ENTMCNC: 32.6 PG
MCHC RBC-ENTMCNC: 33.1 GM/DL
MCV RBC AUTO: 98.6 FL
PLATELET # BLD AUTO: 229 K/UL
POTASSIUM SERPL-SCNC: 5.2 MMOL/L
PROT SERPL-MCNC: 6.5 G/DL
RBC # BLD: 3.62 M/UL
RBC # FLD: 12.9 %
SODIUM SERPL-SCNC: 141 MMOL/L
WBC # FLD AUTO: 5.55 K/UL

## 2024-10-24 ENCOUNTER — OUTPATIENT (OUTPATIENT)
Dept: OUTPATIENT SERVICES | Facility: HOSPITAL | Age: 59
LOS: 1 days | End: 2024-10-24
Payer: COMMERCIAL

## 2024-10-24 ENCOUNTER — APPOINTMENT (OUTPATIENT)
Dept: MRI IMAGING | Facility: IMAGING CENTER | Age: 59
End: 2024-10-24

## 2024-10-24 DIAGNOSIS — Z90.49 ACQUIRED ABSENCE OF OTHER SPECIFIED PARTS OF DIGESTIVE TRACT: Chronic | ICD-10-CM

## 2024-10-24 DIAGNOSIS — K86.89 OTHER SPECIFIED DISEASES OF PANCREAS: ICD-10-CM

## 2024-10-24 DIAGNOSIS — D64.9 ANEMIA, UNSPECIFIED: ICD-10-CM

## 2024-10-24 DIAGNOSIS — Z00.8 ENCOUNTER FOR OTHER GENERAL EXAMINATION: ICD-10-CM

## 2024-10-24 DIAGNOSIS — K83.8 OTHER SPECIFIED DISEASES OF BILIARY TRACT: ICD-10-CM

## 2024-10-24 DIAGNOSIS — Z98.890 OTHER SPECIFIED POSTPROCEDURAL STATES: Chronic | ICD-10-CM

## 2024-10-24 PROCEDURE — 74183 MRI ABD W/O CNTR FLWD CNTR: CPT | Mod: 26

## 2024-10-24 PROCEDURE — 74183 MRI ABD W/O CNTR FLWD CNTR: CPT

## 2024-10-24 PROCEDURE — A9585: CPT

## 2024-11-13 ENCOUNTER — APPOINTMENT (OUTPATIENT)
Dept: PAIN MANAGEMENT | Facility: CLINIC | Age: 59
End: 2024-11-13
Payer: COMMERCIAL

## 2024-11-13 VITALS
HEART RATE: 87 BPM | BODY MASS INDEX: 24.38 KG/M2 | DIASTOLIC BLOOD PRESSURE: 83 MMHG | WEIGHT: 180 LBS | HEIGHT: 72 IN | SYSTOLIC BLOOD PRESSURE: 135 MMHG

## 2024-11-13 DIAGNOSIS — B02.29 OTHER POSTHERPETIC NERVOUS SYSTEM INVOLVEMENT: ICD-10-CM

## 2024-11-13 PROCEDURE — 99205 OFFICE O/P NEW HI 60 MIN: CPT

## 2024-11-13 RX ORDER — AMITRIPTYLINE HYDROCHLORIDE 10 MG/1
10 TABLET, FILM COATED ORAL
Qty: 60 | Refills: 5 | Status: ACTIVE | COMMUNITY
Start: 2024-11-13 | End: 1900-01-01

## 2024-11-13 RX ORDER — NALOXONE HYDROCHLORIDE 4 MG/.1ML
4 SPRAY NASAL
Qty: 1 | Refills: 1 | Status: ACTIVE | COMMUNITY
Start: 2024-11-13 | End: 1900-01-01

## 2024-11-22 ENCOUNTER — TRANSCRIPTION ENCOUNTER (OUTPATIENT)
Age: 59
End: 2024-11-22

## 2024-11-22 ENCOUNTER — APPOINTMENT (OUTPATIENT)
Dept: GASTROENTEROLOGY | Facility: HOSPITAL | Age: 59
End: 2024-11-22

## 2024-11-22 ENCOUNTER — OUTPATIENT (OUTPATIENT)
Dept: OUTPATIENT SERVICES | Facility: HOSPITAL | Age: 59
LOS: 1 days | End: 2024-11-22
Payer: COMMERCIAL

## 2024-11-22 VITALS
WEIGHT: 190.04 LBS | DIASTOLIC BLOOD PRESSURE: 87 MMHG | HEART RATE: 96 BPM | TEMPERATURE: 98 F | RESPIRATION RATE: 16 BRPM | OXYGEN SATURATION: 97 % | SYSTOLIC BLOOD PRESSURE: 138 MMHG

## 2024-11-22 VITALS
RESPIRATION RATE: 12 BRPM | SYSTOLIC BLOOD PRESSURE: 131 MMHG | DIASTOLIC BLOOD PRESSURE: 85 MMHG | OXYGEN SATURATION: 99 % | HEART RATE: 88 BPM

## 2024-11-22 DIAGNOSIS — Z90.49 ACQUIRED ABSENCE OF OTHER SPECIFIED PARTS OF DIGESTIVE TRACT: Chronic | ICD-10-CM

## 2024-11-22 DIAGNOSIS — K83.8 OTHER SPECIFIED DISEASES OF BILIARY TRACT: ICD-10-CM

## 2024-11-22 DIAGNOSIS — Z98.890 OTHER SPECIFIED POSTPROCEDURAL STATES: Chronic | ICD-10-CM

## 2024-11-22 DIAGNOSIS — K86.89 OTHER SPECIFIED DISEASES OF PANCREAS: ICD-10-CM

## 2024-11-22 PROCEDURE — 43237 ENDOSCOPIC US EXAM ESOPH: CPT

## 2024-11-22 PROCEDURE — 43259 EGD US EXAM DUODENUM/JEJUNUM: CPT | Mod: GC

## 2024-11-22 RX ORDER — SODIUM CHLORIDE 9 MG/ML
500 INJECTION, SOLUTION INTRAMUSCULAR; INTRAVENOUS; SUBCUTANEOUS
Refills: 0 | Status: ACTIVE | OUTPATIENT
Start: 2024-11-22

## 2024-11-22 NOTE — ASU DISCHARGE PLAN (ADULT/PEDIATRIC) - FINANCIAL ASSISTANCE
[facility] provides services at a reduced cost to those who are determined to be eligible through [facility]’s financial assistance program. Information regarding [facility]’s financial assistance program can be found by going to [email or web address] or by calling [phone].

## 2024-11-22 NOTE — PRE PROCEDURE NOTE - PRE PROCEDURE EVALUATION
Attending Physician:     Felipe               Procedure: EUS +/- ERCP    Indication for Procedure: dilated PD and CBD.   ________________________________________________________  PAST MEDICAL & SURGICAL HISTORY:  Renteria esophagus      Carpal tunnel syndrome      Hypertension      Diverticulitis      Sore throat  evaluated 1/20/19 [facility] , strep negative , pt treated with augmentin start 1/20/19 for 7 day treatment      S/P colon resection  > 10 years ago      H/O carpal tunnel repair  right -2018        ALLERGIES:  No Known Allergies    HOME MEDICATIONS:  Dexilant 60 mg oral delayed release capsule: 1 cap(s) orally once a day NOTE: As per Pharmacy, last filled 2022  ibuprofen 800 mg oral tablet: 1 tab(s) orally 3 times a day as needed    AICD/PPM: [ ] yes   [ x] no    PERTINENT LAB DATA:                      PHYSICAL EXAMINATION:    T(C): --  HR: --  BP: --  RR: --  SpO2: --    Constitutional: NAD  Neck:  supple  Respiratory: no respiratory stress, no audible wheezes  Cardiovascular: RR  Gastrointestinal: soft, NT/ND  Extremities: No peripheral edema  Neurological: Alert, no focal deficits  Psychiatric: Normal mood, normal affect  Skin: No rashes      COMMENTS:    The patient is a suitable candidate for the planned procedure unless box checked [ ]  No, explain:     Attending Physician:     Felipe               Procedure: EUS +/- ERCP    Indication for Procedure: dilated PD and CBD without discrete mass on MRI/MRCP.  Asymptomatic.  For EUS surveillance.  ________________________________________________________  PAST MEDICAL & SURGICAL HISTORY:  Renteria esophagus      Carpal tunnel syndrome      Hypertension      Diverticulitis      Sore throat  evaluated 1/20/19 [facility] , strep negative , pt treated with augmentin start 1/20/19 for 7 day treatment      S/P colon resection  > 10 years ago      H/O carpal tunnel repair  right -2018        ALLERGIES:  No Known Allergies    HOME MEDICATIONS:  Dexilant 60 mg oral delayed release capsule: 1 cap(s) orally once a day NOTE: As per Pharmacy, last filled 2022  ibuprofen 800 mg oral tablet: 1 tab(s) orally 3 times a day as needed    AICD/PPM: [ ] yes   [ x] no    PERTINENT LAB DATA:                      PHYSICAL EXAMINATION:  Vital signs:  Stable  Constitutional: NAD  Neck:  supple  Respiratory: no respiratory stress, no audible wheezes  Cardiovascular: RRR  Gastrointestinal: soft, NT/ND  Extremities: No peripheral edema  Neurological: Alert, no confusion  Psychiatric: Normal mood, normal affect  Skin: Diffusely red.      COMMENTS:    The patient is a suitable candidate for the planned procedure unless box checked [ ]  No, explain:

## 2024-11-22 NOTE — ASU PATIENT PROFILE, ADULT - NSICDXPASTMEDICALHX_GEN_ALL_CORE_FT
PAST MEDICAL HISTORY:  Renteria esophagus     Carpal tunnel syndrome     Diverticulitis     Hypertension     Sore throat evaluated 1/20/19 [facility] , strep negative , pt treated with augmentin start 1/20/19 for 7 day treatment

## 2024-11-22 NOTE — PRE-ANESTHESIA EVALUATION ADULT - NSANTHTIREDRD_ENT_A_CORE
There is identifiable intrauterine pregnancy with normal heart tones and good movement. Follow-up with your OB as needed. Please return for any abdominal cramping, fevers, vaginal bleeding.
Yes

## 2024-11-22 NOTE — ASU DISCHARGE PLAN (ADULT/PEDIATRIC) - FREQUENT HAND WASHING PREVENTS THE SPREAD OF INFECTION.
Well Visit, Ages 18 to 65: Care Instructions  Well visits can help you stay healthy. Your doctor has checked your overall health and may have suggested ways to take good care of yourself. Your doctor also may have recommended tests. You can help prevent illness with healthy eating, good sleep, vaccinations, regular exercise, and other steps.    Get the tests that you and your doctor decide on. Depending on your age and risks, examples might include screening for diabetes; hepatitis C; HIV; and cervical, breast, lung, and colon cancer. Screening helps find diseases before any symptoms appear.   Eat healthy foods. Choose fruits, vegetables, whole grains, lean protein, and low-fat dairy foods. Limit saturated fat and reduce salt.     Limit alcohol. Men should have no more than 2 drinks a day. Women should have no more than 1. For some people, no alcohol is the best choice.   Exercise. Get at least 30 minutes of exercise on most days of the week. Walking can be a good choice.     Reach and stay at your healthy weight. This will lower your risk for many health problems.   Take care of your mental health. Try to stay connected with friends, family, and community, and find ways to manage stress.     If you're feeling depressed or hopeless, talk to someone. A counselor can help. If you don't have a counselor, talk to your doctor.   Talk to your doctor if you think you may have a problem with alcohol or drug use. This includes prescription medicines, marijuana, and other drugs.     Avoid tobacco and nicotine: Don't smoke, vape, or chew. If you need help quitting, talk to your doctor.   Practice safer sex. Getting tested, using condoms or dental dams, and limiting sex partners can help prevent STIs.     Use birth control if it's important to you to prevent pregnancy. Talk with your doctor about your choices and what might be best for you.   Prevent problems where you can. Protect your skin from too much sun, wash your 
Statement Selected

## 2024-11-22 NOTE — ASU PREOP CHECKLIST - WEIGHT IN KG
[facility]    [address] [facility] [address]    Phone:  [phone]           Grzegorz Lion   MRN: [id number]    Department:  [facility]   Date of Visit:  5/9/2017           Diagnosis     Stable angina pectoris        You were seen by Fernando Danielson MD.      Disclaimer     Follow-up Care:  It is your responsibility to arrange for follow-up care with your healthcare provider or as instructed. Call to get an appointment time.           Contact your doctor for follow-up appointment if not already scheduled.     Schedule an appointment as soon as possible for a visit with Mukesh Souza MD.    Specialty:  Family Practice    Comments:  If symptoms worsen    Contact information    [address] [facility] [address]  [phone]        Medications you received while in the ED through 05/09/2017  2:57 PM     Date/Time Order Dose Route Action    05/09/2017 11:56 AM aspirin chewable 324 mg 324 mg Oral Given    05/09/2017  2:27 PM sodium chloride (PF) 0.9 % injection 2 mL 2 mL Injection Given    05/09/2017 11:54 AM sodium chloride 0.9% infusion   Intravenous New Bag    05/09/2017 11:57 AM ondansetron (ZOFRAN) injection 4 mg 4 mg Intravenous Not Given    05/09/2017 11:55 AM morphine injection 4 mg 4 mg Intravenous Given    05/09/2017  2:01 PM albuterol-ipratropium 2.5 mg/0.5 mg (DUONEB) nebulizer solution 3 mL 3 mL Nebulization Given    05/09/2017  2:27 PM morphine injection 4 mg 4 mg Intravenous Given         What to Do with Your Medications      Notice     No changes were made to your prescriptions during this visit.            Your To Do List     Future Appointments Provider Department Dept Phone    5/24/2017 8:15 AM Mukesh Souza MD [facility] [phone]    7/14/2017 4:15 PM [facility] REMOTE DEVICE CHECK [facility] [address] [phone]    9/6/2017 3:15 PM Braxton Mcdonough MD [facility] [phone]    10/13/2017 8:00 AM Shane Molina MD; K ELECTRO DEVICE CHECK [facility] [phone]      Procedures and tests performed during your visit     Procedure/Test Number of Times Performed    CBC & Auto Differential 1    ECG 1    POCT Troponin 1    Prothrombin Time 1    Troponin I - Point of Care 2    Troponin I Ultra Sensitive 1    XR Chest AP or PA 1      Procedures     None      Imaging Results         XR Chest AP or PA (Final result) Result time:  05/09/17 12:13:38    Final result    Impression:    IMPRESSION: Stable, unchanged and chronic airspace disease in left lower  lobe with small left effusion. Clinical differentiation of  fibrosis/scarring, atelectasis, or an early infiltrate is necessary.      Narrative:    HISTORY: Left-sided chest pain    EXAM: AP chest x-ray    COMPARISON: April 12, 2017    FINDINGS: There is stable airspace disease in the left lower lobe with  persistent blunting of the left costophrenic angle. There is no new  infiltrate or consolidation. The heart size and pulmonary vasculature are  normal. The mediastinal contour is normal. There is no pneumothorax. The  patient device in the left chest is stable.              Discharge Instructions         Chest Wall Pain: Costochondritis    The chest pain that you have had today is caused by costochondritis. This condition is caused by an inflammation of the cartilage joining your ribs to your breastbone. It is not caused by heart or lung problems. The inflammation may have been brought on by a blow to the chest, lifting heavy objects, intense exercise, or an illness that made you cough and sneeze. It often occurs during times of emotional stress. It can be painful, but it is not dangerous. It usually goes away in 1 to 2 weeks. But it may happen again. Rarely, a more serious condition may cause symptoms similar to costochondritis. That’s why it’s important to watch for the warning signs listed below.  Home care  Follow these  guidelines when caring for yourself at home:  · If you feel that emotional stress is a cause of your condition, try to figure out the sources of that stress. It may not be obvious! Learn ways to deal with the stress in your life. This can include regular exercise, muscle relaxation, meditation, or simply taking time out for yourself. For more information about this, talk with your health care provider. Or go to your local library and look at books on “stress reduction.”  · You may use acetaminophen or ibuprofen to control pain, unless another pain medicine was prescribed. If you have liver disease or ever had a stomach ulcer, talk with your health care provider before using these medicines.  · You can also help ease pain by using a hot, wet compress or heating pad. Use this with or without a medicated skin cream that helps relieves pain.  · Do stretching exercise as advised by your provider.  · Take any prescribed medicines as directed.  Follow-up care  Follow up with your health care provider, or as advised, if you do not start to get better in the next 2 days.  When to seek medical advice  Call your health care provider right away if any of these occur:  · A change in the type of pain. Call if it feels different, becomes more serious, lasts longer, or spreads into your shoulder, arm, neck, jaw, or back.  · Shortness of breath or pain gets worse when you breathe  · Weakness, dizziness, or fainting  · Cough with dark-colored sputum (phlegm) or blood  · Abdominal pain  · Dark red or black stools  · Fever of 100.4ºF (38ºC) or higher, or as directed by your health care provider  © 20006134-8656 [employer]. [address] [place] [address], [place] [address]. All rights reserved. This information is not intended as a substitute for professional medical care. Always follow your healthcare professional's instructions.          Discharge References/Attachments     None      Medication Safety: What you need to know      Maintain Security - It is important to keep all medications in a secure location:  Keep out of the reach of children and pets    Consider using a lock box or locked filing cabinet    Place pill bottles in private area such as bedroom or drawer    Don't Share - It is illegal to share your prescription medication, even with family:  The doctor prescribes medications specifically for you and your body    You cannot be sure how the drug may affect others physically or emotionally    It is a criminal offense to share prescriptions    Proper Disposal - It is no longer acceptable to flush or throw away medications:  Recent studies show measurable amounts of medication have been found in drinking water and wildlife due to flushing or throwing away medications    Medication strength changes over time and is not typically safe after one year    Proper disposal removes the medication from your home in a safe way so that others don't have access to it. Use your local drug drop site.    Your local pharmacy can provide information on medication disposal options in your community. The Department of [employer] Drug Enforcement Administration website also has information on safe medication disposal:  www.deadiversion.usdoj.gov/drug_disposal/index.html         86.2

## 2024-11-22 NOTE — ASU PATIENT PROFILE, ADULT - FALL HARM RISK - UNIVERSAL INTERVENTIONS
Bed in lowest position, wheels locked, appropriate side rails in place/Call bell, personal items and telephone in reach/Instruct patient to call for assistance before getting out of bed or chair/Non-slip footwear when patient is out of bed/[place] to call system/Physically safe environment - no spills, clutter or unnecessary equipment/Purposeful Proactive Rounding/Room/bathroom lighting operational, light cord in reach

## 2024-12-13 ENCOUNTER — APPOINTMENT (OUTPATIENT)
Dept: PAIN MANAGEMENT | Facility: CLINIC | Age: 59
End: 2024-12-13

## 2025-02-19 ENCOUNTER — APPOINTMENT (OUTPATIENT)
Dept: PAIN MANAGEMENT | Facility: CLINIC | Age: 60
End: 2025-02-19

## 2025-03-08 ENCOUNTER — EMERGENCY (EMERGENCY)
Facility: HOSPITAL | Age: 60
LOS: 1 days | Discharge: HOME | End: 2025-03-08
Attending: EMERGENCY MEDICINE | Admitting: EMERGENCY MEDICINE
Payer: COMMERCIAL

## 2025-03-08 VITALS
OXYGEN SATURATION: 100 % | TEMPERATURE: 98 F | WEIGHT: 199.96 LBS | HEIGHT: 69 IN | DIASTOLIC BLOOD PRESSURE: 93 MMHG | SYSTOLIC BLOOD PRESSURE: 143 MMHG | HEART RATE: 99 BPM | RESPIRATION RATE: 16 BRPM

## 2025-03-08 VITALS
OXYGEN SATURATION: 98 % | TEMPERATURE: 98 F | DIASTOLIC BLOOD PRESSURE: 94 MMHG | HEART RATE: 96 BPM | SYSTOLIC BLOOD PRESSURE: 135 MMHG | RESPIRATION RATE: 16 BRPM

## 2025-03-08 DIAGNOSIS — Z90.49 ACQUIRED ABSENCE OF OTHER SPECIFIED PARTS OF DIGESTIVE TRACT: Chronic | ICD-10-CM

## 2025-03-08 DIAGNOSIS — Z98.890 OTHER SPECIFIED POSTPROCEDURAL STATES: Chronic | ICD-10-CM

## 2025-03-08 LAB
ALBUMIN SERPL ELPH-MCNC: 3.4 G/DL — SIGNIFICANT CHANGE UP (ref 3.3–5)
ALP SERPL-CCNC: 80 U/L — SIGNIFICANT CHANGE UP (ref 40–120)
ALT FLD-CCNC: 25 U/L — SIGNIFICANT CHANGE UP (ref 4–41)
ANION GAP SERPL CALC-SCNC: 14 MMOL/L — SIGNIFICANT CHANGE UP (ref 7–14)
APPEARANCE UR: CLEAR — SIGNIFICANT CHANGE UP
AST SERPL-CCNC: 46 U/L — HIGH (ref 4–40)
BACTERIA # UR AUTO: NEGATIVE /HPF — SIGNIFICANT CHANGE UP
BASOPHILS # BLD AUTO: 0.02 K/UL — SIGNIFICANT CHANGE UP (ref 0–0.2)
BASOPHILS NFR BLD AUTO: 0.3 % — SIGNIFICANT CHANGE UP (ref 0–2)
BILIRUB SERPL-MCNC: 0.5 MG/DL — SIGNIFICANT CHANGE UP (ref 0.2–1.2)
BILIRUB UR-MCNC: ABNORMAL
BUN SERPL-MCNC: 16 MG/DL — SIGNIFICANT CHANGE UP (ref 7–23)
CALCIUM SERPL-MCNC: 9.4 MG/DL — SIGNIFICANT CHANGE UP (ref 8.4–10.5)
CAST: 0 /LPF — SIGNIFICANT CHANGE UP (ref 0–4)
CHLORIDE SERPL-SCNC: 103 MMOL/L — SIGNIFICANT CHANGE UP (ref 98–107)
CO2 SERPL-SCNC: 23 MMOL/L — SIGNIFICANT CHANGE UP (ref 22–31)
COLOR SPEC: SIGNIFICANT CHANGE UP
CREAT SERPL-MCNC: 0.9 MG/DL — SIGNIFICANT CHANGE UP (ref 0.5–1.3)
DIFF PNL FLD: ABNORMAL
EGFR: 99 ML/MIN/1.73M2 — SIGNIFICANT CHANGE UP
EOSINOPHIL # BLD AUTO: 0.02 K/UL — SIGNIFICANT CHANGE UP (ref 0–0.5)
EOSINOPHIL NFR BLD AUTO: 0.3 % — SIGNIFICANT CHANGE UP (ref 0–6)
GLUCOSE SERPL-MCNC: 153 MG/DL — HIGH (ref 70–99)
GLUCOSE UR QL: NEGATIVE MG/DL — SIGNIFICANT CHANGE UP
HCT VFR BLD CALC: 50.8 % — HIGH (ref 39–50)
HGB BLD-MCNC: 17.6 G/DL — HIGH (ref 13–17)
IANC: 4.7 K/UL — SIGNIFICANT CHANGE UP (ref 1.8–7.4)
IMM GRANULOCYTES NFR BLD AUTO: 0.3 % — SIGNIFICANT CHANGE UP (ref 0–0.9)
KETONES UR-MCNC: ABNORMAL MG/DL
LEUKOCYTE ESTERASE UR-ACNC: ABNORMAL
LIDOCAIN IGE QN: 18 U/L — SIGNIFICANT CHANGE UP (ref 7–60)
LYMPHOCYTES # BLD AUTO: 1.16 K/UL — SIGNIFICANT CHANGE UP (ref 1–3.3)
LYMPHOCYTES # BLD AUTO: 18 % — SIGNIFICANT CHANGE UP (ref 13–44)
MCHC RBC-ENTMCNC: 29.8 PG — SIGNIFICANT CHANGE UP (ref 27–34)
MCHC RBC-ENTMCNC: 34.6 G/DL — SIGNIFICANT CHANGE UP (ref 32–36)
MCV RBC AUTO: 86.1 FL — SIGNIFICANT CHANGE UP (ref 80–100)
MONOCYTES # BLD AUTO: 0.53 K/UL — SIGNIFICANT CHANGE UP (ref 0–0.9)
MONOCYTES NFR BLD AUTO: 8.2 % — SIGNIFICANT CHANGE UP (ref 2–14)
NEUTROPHILS # BLD AUTO: 4.7 K/UL — SIGNIFICANT CHANGE UP (ref 1.8–7.4)
NEUTROPHILS NFR BLD AUTO: 72.9 % — SIGNIFICANT CHANGE UP (ref 43–77)
NITRITE UR-MCNC: NEGATIVE — SIGNIFICANT CHANGE UP
NRBC # BLD AUTO: 0 K/UL — SIGNIFICANT CHANGE UP (ref 0–0)
NRBC # FLD: 0 K/UL — SIGNIFICANT CHANGE UP (ref 0–0)
NRBC BLD AUTO-RTO: 0 /100 WBCS — SIGNIFICANT CHANGE UP (ref 0–0)
PH UR: 6 — SIGNIFICANT CHANGE UP (ref 5–8)
PLATELET # BLD AUTO: 159 K/UL — SIGNIFICANT CHANGE UP (ref 150–400)
POTASSIUM SERPL-MCNC: 4.3 MMOL/L — SIGNIFICANT CHANGE UP (ref 3.5–5.3)
POTASSIUM SERPL-SCNC: 4.3 MMOL/L — SIGNIFICANT CHANGE UP (ref 3.5–5.3)
PROT SERPL-MCNC: 6.3 G/DL — SIGNIFICANT CHANGE UP (ref 6–8.3)
PROT UR-MCNC: 300 MG/DL
RBC # BLD: 5.9 M/UL — HIGH (ref 4.2–5.8)
RBC # FLD: 14.6 % — HIGH (ref 10.3–14.5)
RBC CASTS # UR COMP ASSIST: 839 /HPF — HIGH (ref 0–4)
SODIUM SERPL-SCNC: 140 MMOL/L — SIGNIFICANT CHANGE UP (ref 135–145)
SP GR SPEC: 1.02 — SIGNIFICANT CHANGE UP (ref 1–1.03)
SQUAMOUS # UR AUTO: 0 /HPF — SIGNIFICANT CHANGE UP (ref 0–5)
UROBILINOGEN FLD QL: 1 MG/DL — SIGNIFICANT CHANGE UP (ref 0.2–1)
WBC # BLD: 6.45 K/UL — SIGNIFICANT CHANGE UP (ref 3.8–10.5)
WBC # FLD AUTO: 6.45 K/UL — SIGNIFICANT CHANGE UP (ref 3.8–10.5)
WBC UR QL: 2 /HPF — SIGNIFICANT CHANGE UP (ref 0–5)

## 2025-03-08 PROCEDURE — 74176 CT ABD & PELVIS W/O CONTRAST: CPT | Mod: 26,GC

## 2025-03-08 PROCEDURE — 99285 EMERGENCY DEPT VISIT HI MDM: CPT

## 2025-03-08 RX ORDER — METRONIDAZOLE 250 MG
1 TABLET ORAL
Qty: 30 | Refills: 0
Start: 2025-03-08 | End: 2025-03-17

## 2025-03-08 RX ORDER — METRONIDAZOLE 250 MG
500 TABLET ORAL ONCE
Refills: 0 | Status: COMPLETED | OUTPATIENT
Start: 2025-03-08 | End: 2025-03-08

## 2025-03-08 RX ORDER — CIPROFLOXACIN HCL 250 MG
1 TABLET ORAL
Qty: 20 | Refills: 0
Start: 2025-03-08 | End: 2025-03-17

## 2025-03-08 RX ORDER — ACETAMINOPHEN 500 MG/5ML
1000 LIQUID (ML) ORAL ONCE
Refills: 0 | Status: COMPLETED | OUTPATIENT
Start: 2025-03-08 | End: 2025-03-08

## 2025-03-08 RX ORDER — KETOROLAC TROMETHAMINE 30 MG/ML
15 INJECTION, SOLUTION INTRAMUSCULAR; INTRAVENOUS ONCE
Refills: 0 | Status: DISCONTINUED | OUTPATIENT
Start: 2025-03-08 | End: 2025-03-08

## 2025-03-08 RX ORDER — CIPROFLOXACIN HCL 250 MG
500 TABLET ORAL ONCE
Refills: 0 | Status: COMPLETED | OUTPATIENT
Start: 2025-03-08 | End: 2025-03-08

## 2025-03-08 RX ORDER — IBUPROFEN 200 MG
1 TABLET ORAL
Qty: 20 | Refills: 0
Start: 2025-03-08

## 2025-03-08 RX ADMIN — Medication 1000 MILLILITER(S): at 09:08

## 2025-03-08 RX ADMIN — Medication 500 MILLIGRAM(S): at 12:34

## 2025-03-08 RX ADMIN — Medication 400 MILLIGRAM(S): at 12:53

## 2025-03-08 RX ADMIN — KETOROLAC TROMETHAMINE 15 MILLIGRAM(S): 30 INJECTION, SOLUTION INTRAMUSCULAR; INTRAVENOUS at 09:08

## 2025-03-08 RX ADMIN — Medication 4 MILLIGRAM(S): at 11:16

## 2025-03-08 NOTE — ED PROVIDER NOTE - NSICDXPASTMEDICALHX_GEN_ALL_CORE_FT
PAST MEDICAL HISTORY:  Renteria esophagus     Carpal tunnel syndrome     Diverticulitis     Hypertension     Sore throat evaluated 1/20/19 [facility] , strep negative , pt treated with augmentin start 1/20/19 for 7 day treatment

## 2025-03-08 NOTE — ED PROVIDER NOTE - PATIENT PORTAL LINK FT
You can access the FollowMyHealth Patient Portal offered by [facility] by registering at the following website: [email or web address]. By joining [employer]’s FollowMyHealth portal, you will also be able to view your health information using other applications (apps) compatible with our system.

## 2025-03-08 NOTE — ED PROVIDER NOTE - PROGRESS NOTE DETAILS
NADEGE NDIAYE: labs reviewed. Ua negative. pending CT read. Will continue to monitor and reassess. PA SENTHIL: CT shows acute diverticulitis, no abscess, bladder wall thickening which maybe seen in setting of cystitis. Ua w/ trace leuks, large blood. Discussed with attending, discussed w/ surgery, patient can be discharged home to f/u with PCP, GI, Cipro and flagyl. Patient reassessed, sitting comfortably in chair in NAD, denies any complaints. States feeling better, symptoms improved. The patient was given verbal and written discharge instructions. Specifically, instructions when to return to the ED and when to seek follow-up from their pcp was discussed. Any specialty follow-up was discussed, including how to make an appointment.  Instructions were discussed in simple, plain language and was understood by the patient. The patient understands that should their symptoms worsen or any new symptoms arise, they should return to the ED immediately for further evaluation. All pt's questions were answered. Patient verbalizes understanding.

## 2025-03-08 NOTE — ED PROVIDER NOTE - ATTENDING APP SHARED VISIT CONTRIBUTION OF CARE
Agree with PA note  59-year-old male with history of remote nephrolithiasis, diverticulitis status post partial colectomy presents with left flank pain since yesterday with associated hematuria and dysuria.  States pain is colicky in nature.  Denies any vomiting.  States took his oxycodone last night with some relief.  States most pain is when he urinates.  Denies any history of UTIs or STIs.  Denies fevers.  Physical exam  Gen: pt well appearing in no respiratory distress  vital signs stable  NCAT  Lungs: CTAB/L  Cardiac: s1 s2 no m/r/g  abdomen: soft/NT/ND, no rebound, no guarding  Back no CVA tenderness  ext: no edema  Neuro: CNs intact 5/5 motor UE and LE; sensation intact; gait stable  skin: no rash  Impression  Given endorsing hematuria, prior history of kidney stones, will treat symptomatically for likely nephrolithiasis, patient is stating most pain is over the bladder and penile area.  Patient is afebrile  Will get CT, UA, basic blood work, treat pain and reassess

## 2025-03-08 NOTE — ED PROVIDER NOTE - OBJECTIVE STATEMENT
60 yo M with PMHx of nephrolithiasis, diverticulitis s/p partial colectomy, accompanied by wife, presents to ED c/o left flank pain since yesterday a/w hematuria and dysuria. Reports sharp pain, 8/10, constant, radiating down to from left flank to LLQ, worse lying on the left side. Admits taking Oxycodone for his shingle pain (right upper back) w/o improvement. Admits pain is different from diverticulitis. Admits burning pain w/ urination, urinary urgency, no clots. Denies any fever, chills, n/v/d/c/, chest pain, sob, weakness, numbness, or any other complaints.

## 2025-03-08 NOTE — ED ADULT NURSE NOTE - OBJECTIVE STATEMENT
36.7 Patient received to spot #26, A&OX4, ambulatory, English speaking, Hx, of HTN, kidney stones X 20 years ago, Diverticulitis, and Renteria esophagus, accompanied by wife at bedside, coming to the ED for c/o of left sided flank pain radiating to the LLQ/LUQ since yesterday. Patient endorses hematuria and burning sensation when urinating. Patient denies fevers, chills, chest pain, sob, headache, dizziness, blurry vision, and n/v/d. Respirations even, unlabored, and clear in all fields, S1 and S2 heart sounds heard on ascultation, abdomen soft, nondistended, tender to the LLQ and LUQ, no neuro deficits noted, skin intact. 20 G IV placed to the right AC. Labs drawn and sent, medicated as ordered, care plan continued, comfort measures provided, safety maintained.

## 2025-03-08 NOTE — ED ADULT TRIAGE NOTE - CHIEF COMPLAINT QUOTE
pt c/o left sided flank pain radiating to LLQ since yesterday. +hematuria. hx. HTN. Kidney stones. pt well appearing.

## 2025-03-08 NOTE — ED PROVIDER NOTE - CLINICAL SUMMARY MEDICAL DECISION MAKING FREE TEXT BOX
58 yo M with PMHx of nephrolithiasis, diverticulitis s/p partial colectomy, accompanied by wife, presents to ED c/o left flank pain since yesterday a/w hematuria and dysuria. well appearing male, not in acute distress. there is no fever. Likely renal stone. Ddx: diverticulitis. Plan: labs, ua, CT Abd to r/o stone, pain control, IVF for hydration, and reassessment.

## 2025-03-08 NOTE — ED ADULT NURSE NOTE - NSICDXPASTMEDICALHX_GEN_ALL_CORE_FT
PAST MEDICAL HISTORY:  Renteria esophagus     Carpal tunnel syndrome     Diverticulitis     Hypertension     Sore throat evaluated 1/20/19 [facility] , strep negative , pt treated with augmentin start 1/20/19 for 7 day treatment

## 2025-03-08 NOTE — ED PROVIDER NOTE - NSFOLLOWUPINSTRUCTIONS_ED_ALL_ED_FT
Follow up with your Doctor in 1-2 days.  Bring a copy of results     Rest. Drink plenty of fluids.  Slowly advance diet.  Take Cipro 500mg orally 2 x a day x 10 days.  Take Flagyl 500mg orally 3 x a days x 10 days.  Return to the ER for persistent/worsening or new symptoms fevers, chills, unable to eat/drink, weakness, dizziness, nausea, vomiting, or any concerning symptoms.      Our Discharge Lounge will contact you for appointment with Gastroenterologist.

## 2025-03-09 LAB
CULTURE RESULTS: NO GROWTH — SIGNIFICANT CHANGE UP
SPECIMEN SOURCE: SIGNIFICANT CHANGE UP

## 2025-03-20 ENCOUNTER — NON-APPOINTMENT (OUTPATIENT)
Age: 60
End: 2025-03-20

## 2025-03-20 ENCOUNTER — APPOINTMENT (OUTPATIENT)
Dept: GASTROENTEROLOGY | Facility: CLINIC | Age: 60
End: 2025-03-20

## 2025-03-20 VITALS
BODY MASS INDEX: 24.79 KG/M2 | OXYGEN SATURATION: 98 % | HEART RATE: 95 BPM | DIASTOLIC BLOOD PRESSURE: 68 MMHG | WEIGHT: 183 LBS | SYSTOLIC BLOOD PRESSURE: 132 MMHG | HEIGHT: 72 IN | RESPIRATION RATE: 17 BRPM

## 2025-03-20 DIAGNOSIS — K86.89 OTHER SPECIFIED DISEASES OF PANCREAS: ICD-10-CM

## 2025-03-20 DIAGNOSIS — K83.8 OTHER SPECIFIED DISEASES OF BILIARY TRACT: ICD-10-CM

## 2025-03-20 PROCEDURE — G2211 COMPLEX E/M VISIT ADD ON: CPT | Mod: NC

## 2025-03-20 PROCEDURE — 99214 OFFICE O/P EST MOD 30 MIN: CPT

## 2025-03-24 LAB
AMYLASE/CREAT SERPL: 75 U/L
CANCER AG19-9 SERPL-ACNC: 10 U/ML

## 2025-09-18 ENCOUNTER — APPOINTMENT (OUTPATIENT)
Dept: GASTROENTEROLOGY | Facility: CLINIC | Age: 60
End: 2025-09-18

## (undated) DEVICE — BALLOON US ENDO

## (undated) DEVICE — BIOPSY FORCEP RADIAL JAW 4 STANDARD WITH NEEDLE

## (undated) DEVICE — PACK IV START WITH CHG

## (undated) DEVICE — IRRIGATOR BIO SHIELD

## (undated) DEVICE — FORCEP RADIAL JAW 4 JUMBO 2.8MM 3.2MM 240CM ORANGE DISP

## (undated) DEVICE — SYR ALLIANCE II INFLATION 60ML

## (undated) DEVICE — BITE BLOCK ADULT 20 X 27MM (GREEN)

## (undated) DEVICE — BRUSH COLONOSCOPY CYTOLOGY

## (undated) DEVICE — TUBING SUCTION 20FT

## (undated) DEVICE — FOLEY HOLDER STATLOCK 2 WAY ADULT

## (undated) DEVICE — SENSOR O2 FINGER ADULT

## (undated) DEVICE — CATH IV SAFE BC 20G X 1.16" (PINK)

## (undated) DEVICE — SUCTION YANKAUER NO CONTROL VENT

## (undated) DEVICE — SOL INJ NS 0.9% 500ML 2 PORT

## (undated) DEVICE — CATH IV SAFE BC 22G X 1" (BLUE)

## (undated) DEVICE — TUBING IV SET GRAVITY 3Y 100" MACRO

## (undated) DEVICE — TUBING SUCTION CONN 6FT STERILE